# Patient Record
Sex: FEMALE | Race: OTHER | NOT HISPANIC OR LATINO
[De-identification: names, ages, dates, MRNs, and addresses within clinical notes are randomized per-mention and may not be internally consistent; named-entity substitution may affect disease eponyms.]

---

## 2017-05-23 ENCOUNTER — APPOINTMENT (OUTPATIENT)
Dept: ORTHOPEDIC SURGERY | Facility: CLINIC | Age: 52
End: 2017-05-23

## 2017-07-18 ENCOUNTER — APPOINTMENT (OUTPATIENT)
Dept: ORTHOPEDIC SURGERY | Facility: CLINIC | Age: 52
End: 2017-07-18

## 2018-02-21 ENCOUNTER — APPOINTMENT (OUTPATIENT)
Dept: ORTHOPEDIC SURGERY | Facility: CLINIC | Age: 53
End: 2018-02-21

## 2018-05-16 ENCOUNTER — APPOINTMENT (OUTPATIENT)
Dept: ORTHOPEDIC SURGERY | Facility: CLINIC | Age: 53
End: 2018-05-16

## 2018-10-02 ENCOUNTER — APPOINTMENT (OUTPATIENT)
Dept: RHEUMATOLOGY | Facility: CLINIC | Age: 53
End: 2018-10-02

## 2018-11-21 ENCOUNTER — APPOINTMENT (OUTPATIENT)
Dept: ORTHOPEDIC SURGERY | Facility: CLINIC | Age: 53
End: 2018-11-21

## 2018-12-11 ENCOUNTER — APPOINTMENT (OUTPATIENT)
Dept: ORTHOPEDIC SURGERY | Facility: CLINIC | Age: 53
End: 2018-12-11

## 2019-01-15 ENCOUNTER — APPOINTMENT (OUTPATIENT)
Dept: ORTHOPEDIC SURGERY | Facility: CLINIC | Age: 54
End: 2019-01-15

## 2019-02-04 ENCOUNTER — FORM ENCOUNTER (OUTPATIENT)
Age: 54
End: 2019-02-04

## 2019-02-05 ENCOUNTER — APPOINTMENT (OUTPATIENT)
Dept: ORTHOPEDIC SURGERY | Facility: CLINIC | Age: 54
End: 2019-02-05
Payer: MEDICARE

## 2019-02-05 ENCOUNTER — OUTPATIENT (OUTPATIENT)
Dept: OUTPATIENT SERVICES | Facility: HOSPITAL | Age: 54
LOS: 1 days | End: 2019-02-05
Payer: MEDICARE

## 2019-02-05 PROCEDURE — 73562 X-RAY EXAM OF KNEE 3: CPT | Mod: 26,50

## 2019-02-05 PROCEDURE — 20610 DRAIN/INJ JOINT/BURSA W/O US: CPT | Mod: 50

## 2019-02-05 PROCEDURE — 99203 OFFICE O/P NEW LOW 30 MIN: CPT | Mod: 25

## 2019-02-05 PROCEDURE — 73562 X-RAY EXAM OF KNEE 3: CPT

## 2019-02-05 NOTE — PROCEDURE
[Injection] : Injection [Bilateral] : of bilateral [Knee Joint] : knee joint [Osteoarthritis] : Osteoarthritis [Diagnostic] : Diagnostic [Patient] : patient [Alcohol] : Alcohol [Betadine] : Betadine [None] : No local anesthesia was used [Anterior] : anterior [20] : a 20-gauge [1% Lidocaine___(mL)] : [unfilled] mL of 1% Lidocaine [Triamcinolone 40mg/mL___(mL)] : [unfilled] ~UmL of 40mg/mL triamcinolone [Bandage Applied] : a bandage [Tolerated Well] : The patient tolerated the procedure well

## 2019-02-06 ENCOUNTER — TRANSCRIPTION ENCOUNTER (OUTPATIENT)
Age: 54
End: 2019-02-06

## 2019-02-06 VITALS — HEIGHT: 64 IN | BODY MASS INDEX: 25.61 KG/M2 | WEIGHT: 150 LBS

## 2019-02-08 NOTE — PHYSICAL EXAM
[de-identified] : Gen: NAD/AAOx3, cooperative\par HEENT: NC/AT\par CV: RRR\par Resp: nonlabored breathing, equal chest rise\par Abd: soft/nontender/nondistended\par B knees:\par - Varus alignment, passively correctable\par - L ROM 0-140, R ROM 5-130\par - Lig stable V/V/A/P\par - TTP medial joint lines, minimal pain at lateral\par - Pain and crepitus on patellar grind\par - Motor 5/5\par - NVID [de-identified] : Bilateral knees with tricompartmental OA, worst in medial compartments

## 2019-02-08 NOTE — HISTORY OF PRESENT ILLNESS
[de-identified] : 54y/o female seeking evaluation of bilateral knee and low back pain. PMH significant for 3-level lumbar fusion some years ago which has always been painful, as well as questionable history of SLE s/p 8 years of antirheumatic treatment - she reports that her rheumatologist eventually disavowed the diagnosis and stopped treatment. She is dependent on oxycodone, oxycontin, gabapentin, and clonazepam. She reports a usage of 300mg oxycodone and 90mg oxycontin daily. This has been the routine for approximately two years. For the knees, she has attempted multiple types of injections over the last 3-4 years, including CSI and Supartz, all with no relief whatsoever. She is disabled secondary to her low back issues. She reports relatively little pain with walking, more pain and disability when she has to sit for a long period of time. There is significant night pain in the medial aspect of her knees.

## 2019-02-08 NOTE — DISCUSSION/SUMMARY
[de-identified] : 54y/o female with chronic pain, lumbar spine disease s/p 3-level fusion, and bilateral knee OA\par - Pt may be a candidate for bilateral TKA, but given her complex history of chronic and potentially neuropathic pain, must confirm that knees are truly the source of her pain\par - Injections given today as above for diagnostic and therapeutic purposes; she reports knee pain relief from 3/10 to 0/10\par - Pt to seek evaluation of ongoing lumbar spine issues from Dr. Montana\par - Pt to f/u with her PMD to discuss eventual opioid wean\par - PT\par - RTC 2wk to re-eval...

## 2019-02-11 ENCOUNTER — APPOINTMENT (OUTPATIENT)
Dept: ORTHOPEDIC SURGERY | Facility: CLINIC | Age: 54
End: 2019-02-11

## 2019-02-19 ENCOUNTER — APPOINTMENT (OUTPATIENT)
Dept: ORTHOPEDIC SURGERY | Facility: CLINIC | Age: 54
End: 2019-02-19
Payer: MEDICARE

## 2019-02-19 PROCEDURE — 99213 OFFICE O/P EST LOW 20 MIN: CPT

## 2019-03-01 NOTE — PHYSICAL EXAM
[de-identified] : Gen: NAD/AAOx3, cooperative\par HEENT: NC/AT\par CV: RRR\par Resp: nonlabored breathing, equal chest rise\par Abd: soft/nontender/nondistended\par B knees:\par - Varus alignment, passively correctable\par - L ROM 0-140, R ROM 5-130\par - Lig stable V/V/A/P\par - TTP medial joint lines, minimal pain at lateral\par - Pain and crepitus on patellar grind\par - Motor 5/5\par - NVID

## 2019-03-01 NOTE — DISCUSSION/SUMMARY
[de-identified] : 54y/o female 2wk s/p bilateral knee CSI for osteoarthritis\par - Begin PT\par - Seek opinions from Esther Montana and Eber\par - RTC 10wk for possible repeat CSI...

## 2019-03-01 NOTE — HISTORY OF PRESENT ILLNESS
[de-identified] : 54y/o female following up 2wk s/p bilateral knee CSI for osteoarthritis. States she had 100% pain relief since the day after injection. Minimal pain in the left knee today only. She did not follow up yet with Dr. Montana for the spine or Dr. Velázquez for her chronic opioid dependence. She has not yet begun PT.

## 2019-03-05 ENCOUNTER — APPOINTMENT (OUTPATIENT)
Dept: ORTHOPEDIC SURGERY | Facility: CLINIC | Age: 54
End: 2019-03-05

## 2019-04-09 ENCOUNTER — TRANSCRIPTION ENCOUNTER (OUTPATIENT)
Age: 54
End: 2019-04-09

## 2019-04-09 ENCOUNTER — APPOINTMENT (OUTPATIENT)
Dept: ORTHOPEDIC SURGERY | Facility: CLINIC | Age: 54
End: 2019-04-09

## 2019-05-04 ENCOUNTER — EMERGENCY (EMERGENCY)
Facility: HOSPITAL | Age: 54
LOS: 0 days | Discharge: HOME | End: 2019-05-04
Attending: EMERGENCY MEDICINE | Admitting: EMERGENCY MEDICINE
Payer: MEDICARE

## 2019-05-04 VITALS
SYSTOLIC BLOOD PRESSURE: 168 MMHG | OXYGEN SATURATION: 98 % | DIASTOLIC BLOOD PRESSURE: 114 MMHG | HEART RATE: 134 BPM | RESPIRATION RATE: 21 BRPM | TEMPERATURE: 97 F

## 2019-05-04 DIAGNOSIS — Z88.6 ALLERGY STATUS TO ANALGESIC AGENT: ICD-10-CM

## 2019-05-04 DIAGNOSIS — Z90.49 ACQUIRED ABSENCE OF OTHER SPECIFIED PARTS OF DIGESTIVE TRACT: ICD-10-CM

## 2019-05-04 DIAGNOSIS — K52.9 NONINFECTIVE GASTROENTERITIS AND COLITIS, UNSPECIFIED: ICD-10-CM

## 2019-05-04 DIAGNOSIS — R10.9 UNSPECIFIED ABDOMINAL PAIN: ICD-10-CM

## 2019-05-04 LAB
ALBUMIN SERPL ELPH-MCNC: 5.1 G/DL — SIGNIFICANT CHANGE UP (ref 3.5–5.2)
ALP SERPL-CCNC: 91 U/L — SIGNIFICANT CHANGE UP (ref 30–115)
ALT FLD-CCNC: 9 U/L — SIGNIFICANT CHANGE UP (ref 0–41)
ANION GAP SERPL CALC-SCNC: 18 MMOL/L — HIGH (ref 7–14)
APPEARANCE UR: ABNORMAL
AST SERPL-CCNC: 12 U/L — SIGNIFICANT CHANGE UP (ref 0–41)
BACTERIA # UR AUTO: ABNORMAL /HPF
BASOPHILS # BLD AUTO: 0.05 K/UL — SIGNIFICANT CHANGE UP (ref 0–0.2)
BASOPHILS NFR BLD AUTO: 0.5 % — SIGNIFICANT CHANGE UP (ref 0–1)
BILIRUB DIRECT SERPL-MCNC: <0.2 MG/DL — SIGNIFICANT CHANGE UP (ref 0–0.2)
BILIRUB INDIRECT FLD-MCNC: >0.3 MG/DL — SIGNIFICANT CHANGE UP (ref 0.2–1.2)
BILIRUB SERPL-MCNC: 0.5 MG/DL — SIGNIFICANT CHANGE UP (ref 0.2–1.2)
BILIRUB UR-MCNC: NEGATIVE — SIGNIFICANT CHANGE UP
BUN SERPL-MCNC: 9 MG/DL — LOW (ref 10–20)
CALCIUM SERPL-MCNC: 10.1 MG/DL — SIGNIFICANT CHANGE UP (ref 8.5–10.1)
CHLORIDE SERPL-SCNC: 100 MMOL/L — SIGNIFICANT CHANGE UP (ref 98–110)
CO2 SERPL-SCNC: 24 MMOL/L — SIGNIFICANT CHANGE UP (ref 17–32)
COLOR SPEC: YELLOW — SIGNIFICANT CHANGE UP
CREAT SERPL-MCNC: 0.8 MG/DL — SIGNIFICANT CHANGE UP (ref 0.7–1.5)
DIFF PNL FLD: NEGATIVE — SIGNIFICANT CHANGE UP
EOSINOPHIL # BLD AUTO: 0.03 K/UL — SIGNIFICANT CHANGE UP (ref 0–0.7)
EOSINOPHIL NFR BLD AUTO: 0.3 % — SIGNIFICANT CHANGE UP (ref 0–8)
EPI CELLS # UR: ABNORMAL /HPF
GLUCOSE SERPL-MCNC: 120 MG/DL — HIGH (ref 70–99)
GLUCOSE UR QL: NEGATIVE MG/DL — SIGNIFICANT CHANGE UP
HCG SERPL QL: NEGATIVE — SIGNIFICANT CHANGE UP
HCT VFR BLD CALC: 40.5 % — SIGNIFICANT CHANGE UP (ref 37–47)
HGB BLD-MCNC: 14.3 G/DL — SIGNIFICANT CHANGE UP (ref 12–16)
IMM GRANULOCYTES NFR BLD AUTO: 0.6 % — HIGH (ref 0.1–0.3)
KETONES UR-MCNC: NEGATIVE — SIGNIFICANT CHANGE UP
LACTATE SERPL-SCNC: 1.5 MMOL/L — SIGNIFICANT CHANGE UP (ref 0.5–2.2)
LEUKOCYTE ESTERASE UR-ACNC: ABNORMAL
LIDOCAIN IGE QN: 15 U/L — SIGNIFICANT CHANGE UP (ref 7–60)
LYMPHOCYTES # BLD AUTO: 1.74 K/UL — SIGNIFICANT CHANGE UP (ref 1.2–3.4)
LYMPHOCYTES # BLD AUTO: 16.9 % — LOW (ref 20.5–51.1)
MCHC RBC-ENTMCNC: 29.5 PG — SIGNIFICANT CHANGE UP (ref 27–31)
MCHC RBC-ENTMCNC: 35.3 G/DL — SIGNIFICANT CHANGE UP (ref 32–37)
MCV RBC AUTO: 83.7 FL — SIGNIFICANT CHANGE UP (ref 81–99)
MONOCYTES # BLD AUTO: 0.38 K/UL — SIGNIFICANT CHANGE UP (ref 0.1–0.6)
MONOCYTES NFR BLD AUTO: 3.7 % — SIGNIFICANT CHANGE UP (ref 1.7–9.3)
NEUTROPHILS # BLD AUTO: 8.06 K/UL — HIGH (ref 1.4–6.5)
NEUTROPHILS NFR BLD AUTO: 78 % — HIGH (ref 42.2–75.2)
NITRITE UR-MCNC: NEGATIVE — SIGNIFICANT CHANGE UP
NRBC # BLD: 0 /100 WBCS — SIGNIFICANT CHANGE UP (ref 0–0)
PH UR: 7 — SIGNIFICANT CHANGE UP (ref 5–8)
PLATELET # BLD AUTO: 370 K/UL — SIGNIFICANT CHANGE UP (ref 130–400)
POTASSIUM SERPL-MCNC: 3.6 MMOL/L — SIGNIFICANT CHANGE UP (ref 3.5–5)
POTASSIUM SERPL-SCNC: 3.6 MMOL/L — SIGNIFICANT CHANGE UP (ref 3.5–5)
PROT SERPL-MCNC: 7.7 G/DL — SIGNIFICANT CHANGE UP (ref 6–8)
PROT UR-MCNC: NEGATIVE MG/DL — SIGNIFICANT CHANGE UP
RBC # BLD: 4.84 M/UL — SIGNIFICANT CHANGE UP (ref 4.2–5.4)
RBC # FLD: 11.6 % — SIGNIFICANT CHANGE UP (ref 11.5–14.5)
SODIUM SERPL-SCNC: 142 MMOL/L — SIGNIFICANT CHANGE UP (ref 135–146)
SP GR SPEC: <=1.005 — SIGNIFICANT CHANGE UP (ref 1.01–1.03)
UROBILINOGEN FLD QL: 0.2 MG/DL — SIGNIFICANT CHANGE UP (ref 0.2–0.2)
WBC # BLD: 10.32 K/UL — SIGNIFICANT CHANGE UP (ref 4.8–10.8)
WBC # FLD AUTO: 10.32 K/UL — SIGNIFICANT CHANGE UP (ref 4.8–10.8)
WBC UR QL: ABNORMAL /HPF

## 2019-05-04 PROCEDURE — 99284 EMERGENCY DEPT VISIT MOD MDM: CPT | Mod: GC

## 2019-05-04 PROCEDURE — 93010 ELECTROCARDIOGRAM REPORT: CPT

## 2019-05-04 PROCEDURE — 74177 CT ABD & PELVIS W/CONTRAST: CPT | Mod: 26

## 2019-05-04 RX ORDER — HYDROMORPHONE HYDROCHLORIDE 2 MG/ML
1 INJECTION INTRAMUSCULAR; INTRAVENOUS; SUBCUTANEOUS ONCE
Qty: 0 | Refills: 0 | Status: DISCONTINUED | OUTPATIENT
Start: 2019-05-04 | End: 2019-05-04

## 2019-05-04 RX ORDER — CIPROFLOXACIN LACTATE 400MG/40ML
1 VIAL (ML) INTRAVENOUS
Qty: 14 | Refills: 0
Start: 2019-05-04 | End: 2019-05-10

## 2019-05-04 RX ORDER — SODIUM CHLORIDE 9 MG/ML
1000 INJECTION INTRAMUSCULAR; INTRAVENOUS; SUBCUTANEOUS ONCE
Qty: 0 | Refills: 0 | Status: COMPLETED | OUTPATIENT
Start: 2019-05-04 | End: 2019-05-04

## 2019-05-04 RX ORDER — METRONIDAZOLE 500 MG
1 TABLET ORAL
Qty: 21 | Refills: 0
Start: 2019-05-04 | End: 2019-05-10

## 2019-05-04 RX ORDER — MORPHINE SULFATE 50 MG/1
4 CAPSULE, EXTENDED RELEASE ORAL ONCE
Qty: 0 | Refills: 0 | Status: DISCONTINUED | OUTPATIENT
Start: 2019-05-04 | End: 2019-05-04

## 2019-05-04 RX ORDER — METRONIDAZOLE 500 MG
500 TABLET ORAL ONCE
Qty: 0 | Refills: 0 | Status: COMPLETED | OUTPATIENT
Start: 2019-05-04 | End: 2019-05-04

## 2019-05-04 RX ORDER — CIPROFLOXACIN LACTATE 400MG/40ML
500 VIAL (ML) INTRAVENOUS ONCE
Qty: 0 | Refills: 0 | Status: COMPLETED | OUTPATIENT
Start: 2019-05-04 | End: 2019-05-04

## 2019-05-04 RX ADMIN — MORPHINE SULFATE 4 MILLIGRAM(S): 50 CAPSULE, EXTENDED RELEASE ORAL at 19:45

## 2019-05-04 RX ADMIN — MORPHINE SULFATE 4 MILLIGRAM(S): 50 CAPSULE, EXTENDED RELEASE ORAL at 18:19

## 2019-05-04 RX ADMIN — SODIUM CHLORIDE 1000 MILLILITER(S): 9 INJECTION INTRAMUSCULAR; INTRAVENOUS; SUBCUTANEOUS at 20:20

## 2019-05-04 RX ADMIN — SODIUM CHLORIDE 2000 MILLILITER(S): 9 INJECTION INTRAMUSCULAR; INTRAVENOUS; SUBCUTANEOUS at 19:00

## 2019-05-04 RX ADMIN — MORPHINE SULFATE 4 MILLIGRAM(S): 50 CAPSULE, EXTENDED RELEASE ORAL at 19:52

## 2019-05-04 RX ADMIN — Medication 500 MILLIGRAM(S): at 23:33

## 2019-05-04 RX ADMIN — SODIUM CHLORIDE 2000 MILLILITER(S): 9 INJECTION INTRAMUSCULAR; INTRAVENOUS; SUBCUTANEOUS at 20:37

## 2019-05-04 RX ADMIN — HYDROMORPHONE HYDROCHLORIDE 1 MILLIGRAM(S): 2 INJECTION INTRAMUSCULAR; INTRAVENOUS; SUBCUTANEOUS at 22:51

## 2019-05-04 NOTE — ED PROVIDER NOTE - NS ED ROS FT
Constitutional: no fever, chills, no recent weight loss, change in appetite or malaise  Cardiac: No chest pain, SOB or edema.  Respiratory: No cough or respiratory distress  GI: + LLQ abdominal pain. No n/v/d/rectal bleeding  : No dysuria, frequency, urgency or hematuria  MS: no pain to back or extremities, no loss of ROM, no weakness  Neuro: No headache or weakness. No LOC.  Skin: No skin rash.  Endocrine: No history of thyroid disease or diabetes.  Except as documented in the HPI, all other systems are negative.

## 2019-05-04 NOTE — ED PROVIDER NOTE - CARE PROVIDER_API CALL
Dakota Parikh)  Gastroenterology; Internal Medicine  [address] [place] [address]  Phone: [phone]  Fax: [phone]  Follow Up Time:

## 2019-05-04 NOTE — ED PROVIDER NOTE - OBJECTIVE STATEMENT
53 year old F with hx of spinal fusion, cholecystectomy c/o LLQ abdominal pain x 3 days. The pain is sharp, constant and radiates to her left lower back. Sts the pain worsened today so decided to come to ED. She denies any assoc symptoms; no fever/chills, n/v, urinary symptoms, constipation/diarrhea, recent surgeries, chest pain, sob.

## 2019-05-04 NOTE — ED PROVIDER NOTE - PHYSICAL EXAMINATION
CONSTITUTIONAL: Well-appearing; well-nourished; in no apparent distress.   NECK: Supple; non-tender; no cervical lymphadenopathy.   CARDIOVASCULAR: Normal S1, S2; no murmurs, rubs, or gallops.   RESPIRATORY: Normal chest excursion with respiration; breath sounds clear and equal bilaterally; no wheezes, rhonchi, or rales.  GI/: Normal bowel sounds; non-distended; + mild LLQ ttp, no rebound or guarding. Neg murphys sign. No ttp at mcburneys point. No CVA ttp.  MS: No evidence of trauma or deformity. Normal ROM in all four extremities; non-tender to palpation; distal pulses are normal.   SKIN: Normal for age and race; warm; dry; good turgor; no apparent lesions or exudate.   NEURO/PSYCH: A & O x 4; grossly unremarkable. mood and manner are appropriate. Grooming and personal hygiene are appropriate.

## 2019-05-04 NOTE — ED PROVIDER NOTE - ATTENDING CONTRIBUTION TO CARE
54 yo f with presents with LLQ pain for 3 days.  no cp, no sob, no headache, no dizziness, no fevers, no chills.  no dysuria.  no other complaints.  awake, alert.  neck supple.  Abd soft with LLQ tenderness, no guarding.  Lungs clear.  mmm.  p:  ct, labs, pain control, ivf, reassess.

## 2019-05-04 NOTE — ED ADULT NURSE NOTE - NSIMPLEMENTINTERV_GEN_ALL_ED
Implemented All Universal Safety Interventions:  [place] to call system. Call bell, personal items and telephone within reach. Instruct patient to call for assistance. Room bathroom lighting operational. Non-slip footwear when patient is off stretcher. Physically safe environment: no spills, clutter or unnecessary equipment. Stretcher in lowest position, wheels locked, appropriate side rails in place.

## 2019-05-04 NOTE — ED PROVIDER NOTE - CLINICAL SUMMARY MEDICAL DECISION MAKING FREE TEXT BOX
pt with abd pain.  CT with colitis.  pt nontoxic, well appearing.  pt ambulating in the ED without difficutly.  vitals improved.  pt given abx.  pt given rx for abx.  will dc with outpatient GI follow up for further evaluation.    Pt understands importance of GI follow up.  pt also with elevated BP.  I spoke to pt at length about this and about the need to follow up with pmd for this and to have BP repeated and if still elevated will need to be treated.  pt understands and will follow up.  pt thinks it is related to her pain and being in the ED.  Pt only complained of lower abd pain.  NO CHEST PAIN, no SOB.  pt with colitis. pt with abd pain.  CT with colitis.  pt nontoxic, well appearing.  pt ambulating in the ED without difficutly.  vitals improved.  pt given abx.  pt given rx for abx.  will dc with outpatient GI follow up for further evaluation.    Pt understands importance of GI follow up.  pt also with elevated BP.  I spoke to pt at length about this and about the need to follow up with pmd for this and to have BP repeated and if still elevated will need to be treated.  pt understands and will follow up.  pt thinks it is related to her pain and being in the ED.  Pt has history of chronic pain and is on medications for pain as outpatient.    Pt only complained of lower abd pain.  NO CHEST PAIN, no SOB.  pt with colitis.

## 2019-05-05 VITALS
RESPIRATION RATE: 18 BRPM | DIASTOLIC BLOOD PRESSURE: 105 MMHG | SYSTOLIC BLOOD PRESSURE: 180 MMHG | OXYGEN SATURATION: 100 % | HEART RATE: 107 BPM

## 2019-05-05 NOTE — ED POST DISCHARGE NOTE - ADDITIONAL DOCUMENTATION
I called pt to follow up.  Pt reports she is doing a little better today.  pain is improving.  no worsening symptoms.  no nausea, no vomiting, no cp, no sob, no fevers.  pt is going to take the abx.  pt is going to follow up with GI.  pt given strict follow up and return precautions.  pt understands to return to ed for any worsening symptoms, fevers, vomiting, etc, or any concerning symptoms.  I also stressed to pt the importance of follow up for her BP and to have it repeated and re evaluated.  pt understands and will follow up with her doctor tomorrow for repeat.  pt aware if it remains elevated she will need further evaluation and management.  pt reports she only has left lower abd pain.  no fevers, no chills, no cp, no sob. I called pt to follow up.  Pt reports she is doing a little better today.  pain is improving.  no worsening symptoms.  no nausea, no vomiting, no cp, no sob, no fevers.  pt is going to take the abx.  pt is going to follow up with GI.  pt given strict follow up and return precautions.  pt understands to return to ed for any worsening symptoms, fevers, vomiting, etc, or any concerning symptoms.  I also stressed to pt the importance of follow up for her BP and to have it repeated and re evaluated.  pt understands and will follow up with her doctor tomorrow for repeat.  pt aware if it remains elevated she will need further evaluation and management.  pt reports she only has left lower abd pain.  no fevers, no chills, no cp, no sob.  I also informed pt of official ct finding about appendix.  Pt never had any RLQ pain.  pt has NO RIGHT SIDED pain at all.  I spoke to  her at length about this ct finding and she understands if she has any right sided pain to return to the ED.  pt aware of need for GI follow up and need for colonoscopy.  pt will follow up.  On exam PT HAD NO RIGHT SIDED TENDERNESS.

## 2019-05-07 ENCOUNTER — APPOINTMENT (OUTPATIENT)
Dept: ORTHOPEDIC SURGERY | Facility: CLINIC | Age: 54
End: 2019-05-07

## 2019-05-28 ENCOUNTER — APPOINTMENT (OUTPATIENT)
Dept: ORTHOPEDIC SURGERY | Facility: CLINIC | Age: 54
End: 2019-05-28

## 2019-05-29 ENCOUNTER — APPOINTMENT (OUTPATIENT)
Dept: NEUROSURGERY | Facility: CLINIC | Age: 54
End: 2019-05-29

## 2019-05-30 NOTE — ED PROVIDER NOTE - PROVIDER TOKENS
POST CATARACT SURGERY EYE INSTRUCTIONS  DR. NOEL           Eye Medications    VIGAMOX/OFLOXACIN (Tan Cap)    KETOROLAC (Browne Cap)    PREDNISOLONE (Pink or White Cap)    If you opted for the individual bottles of eye medications follow these instructions.    *Instill one drop from each bottle into the operative eye 3 times a day until each  bottle is empty.    *Wait 5 minutes between each drop.    If you opted for the one bottle containing all 3 eye medications, follow these instructions.    *Instill one drop into the operative eye 3 times a day until the bottle is empty.       - If your are currently being treated for glaucoma please continue your    medication as normally prescribed.     - Wear eye shield while sleeping for 3 days     - Refrain from heavy lifting, bending, straining, or strenuous activity for 1      week.     - Do not rub or push on the operative eye for 1 week (you may wipe the   eye lid(s) gently with a wet wash cloth to remove matter from eyelashes).     - You may bathe or shower, but do not get the eye wet for 1 month      (swimming, hot tubs or other water activities).     - Minor itching, scratching sensation, burning sensation, etc. is normal.     Report any severe eye pain or loss of vision.     - Please call our office at [phone] if you have questions or     Concerns.  [facility]Day [facility]   Adult Discharge Orders & Instructions     For 24 hours after surgery    1. Get plenty of rest.  A responsible adult must stay with you for at least 24 hours after you leave the hospital.   2. Do not drive or use heavy equipment.  If you have weakness or tingling, don't drive or use heavy equipment until this feeling goes away.  3. Do not drink alcohol.  4. Avoid strenuous or risky activities.  Ask for help when climbing stairs.   5. You may feel lightheaded.  If so, sit for a few minutes before standing.  Have someone help you get up.   6. You may have a slight fever. Call  the doctor if your fever is over 100 F (37.7 C) (taken under the tongue) or lasts longer than 24 hours.  7. You may have a dry mouth, a sore throat, muscle aches or trouble sleeping.  These should go away after 24 hours.  8. Do not make important or legal decisions.  We don t expect you to have any problems from the surgery or treatment you had today. Just in case, here s what to do if you have pain, upset stomach (nausea), bleeding or infection:  Pain:  Take medicines your doctor has prescribed or over-the-counter medicine they have suggested. Resting and using ice packs can help, too. For surgery on an arm or leg, raise it on a pillow to ease swelling. Call your doctor if these methods don t work.  Copyright [employer], Licensed under [employer]  Upset stomach (nausea):  Take anti-nausea medicine approved by your doctor. Drink clear liquids like apple juice, ginger ale, broth or 7-Up. Be sure to drink enough fluids. Rest can help, too. Move to normal foods when you re ready. Bleeding:      Call your doctor.  Copyright [employer], Licensed under [employer]  Fever/Infection: Please call your doctor if you have any of these signs:    Redness    Swelling    Wound feels warm    Pain gets worse    Bad-smelling fluid leaks from wound    Fever or chills  Call your doctor for any of the followin.  It has been over 8 to 10 hours since surgery and you are still not able to urinate (pass water).    2.  Headache for over 24 hours.    Nurse advice line: [phone]       PROVIDER:[TOKEN:[[id number]]]

## 2019-06-03 ENCOUNTER — APPOINTMENT (OUTPATIENT)
Dept: NEUROSURGERY | Facility: CLINIC | Age: 54
End: 2019-06-03

## 2019-07-15 ENCOUNTER — APPOINTMENT (OUTPATIENT)
Dept: ORTHOPEDIC SURGERY | Facility: CLINIC | Age: 54
End: 2019-07-15

## 2019-08-13 ENCOUNTER — APPOINTMENT (OUTPATIENT)
Dept: ORTHOPEDIC SURGERY | Facility: CLINIC | Age: 54
End: 2019-08-13
Payer: MEDICARE

## 2019-08-13 PROCEDURE — 99213 OFFICE O/P EST LOW 20 MIN: CPT | Mod: 25

## 2019-08-13 PROCEDURE — 20610 DRAIN/INJ JOINT/BURSA W/O US: CPT | Mod: 50

## 2019-09-02 NOTE — ASSESSMENT
[FreeTextEntry1] : Assessment/plan\par Bilateral knee arthritis\par \par Bilateral knees injected with corticosteroid as described above, she'll followup in 3 months. Today she will rest ice and use Tylenol as needed for pain.\par \par All medical record entries made by the PA/Scribe/Fellow are at my, Dr. Swapnil Elise's direction and personally dictated by me on 08/13/2019]. I have reviewed the chart and agree that the record accurately reflects my personal performance of the history, physical exam, assessment, and plan. I have also personally directed reviewed, and agreed with the chart.\par

## 2019-09-02 NOTE — PROCEDURE
[de-identified] : After obtaining verbal consent and under the normal sterile conditions bilateral knee joints were injected with corticosteroid, 4 cc lidocaine and 1 cc of Kenalog.

## 2019-09-02 NOTE — HISTORY OF PRESENT ILLNESS
[de-identified] : Here today for bilateral knee corticosteroid injections, she had her last injections in February and she did great for 6 months. She wants the same injections again today. No other complaints.

## 2019-09-02 NOTE — PHYSICAL EXAM
[de-identified] : Gen: NAD/AAOx3, cooperative\par HEENT: NC/AT\par CV: RRR\par Resp: nonlabored breathing, equal chest rise\par Abd: soft/nontender/nondistended\par B knees:\par - Varus alignment, passively correctable\par - L ROM 0-140, R ROM 5-130\par - Lig stable V/V/A/P\par - TTP medial joint lines, minimal pain at lateral\par - Pain and crepitus on patellar grind\par - Motor 5/5\par - NVID \par

## 2019-10-22 ENCOUNTER — APPOINTMENT (OUTPATIENT)
Dept: ORTHOPEDIC SURGERY | Facility: CLINIC | Age: 54
End: 2019-10-22
Payer: MEDICARE

## 2019-10-22 PROCEDURE — 99212 OFFICE O/P EST SF 10 MIN: CPT

## 2019-10-29 ENCOUNTER — APPOINTMENT (OUTPATIENT)
Dept: ORTHOPEDIC SURGERY | Facility: CLINIC | Age: 54
End: 2019-10-29

## 2019-11-06 ENCOUNTER — APPOINTMENT (OUTPATIENT)
Dept: ORTHOPEDIC SURGERY | Facility: CLINIC | Age: 54
End: 2019-11-06

## 2019-11-08 NOTE — ASSESSMENT
[FreeTextEntry1] : 54yo female with bilateral knee OA - severe\par \par euflexa 1 of 3 given today\par mobic Rx given\par follow up in 1 week for euflexa 2 of 3. \par \par All medical record entries made by the PA/Scribe/Fellow are at my, Dr. Swapnil Elise's direction and personally dictated by me on 10/22/2019]. I have reviewed the chart and agree that the record accurately reflects my personal performance of the history, physical exam, assessment, and plan. I have also personally directed reviewed, and agreed with the chart.\par

## 2019-11-08 NOTE — HISTORY OF PRESENT ILLNESS
[6] : an average pain level of 6/10 [de-identified] : bilaterla knee OA. steroid injections 2 months ago have not helped. not a candidate for regen study due to narcotic use. \par \par Euflexxa\par 1 of 3 bilateral knee\par lot: [id number]\par exp: 2020-09-28\par \par

## 2019-11-08 NOTE — PHYSICAL EXAM
[de-identified] : Right and Left Knee: skin is intact, no erythema. mild soft tissue swelling and  joint effusion. + lateral and medial joint line tenderness, negative apleys, negative lachman, no collateral ligamentous laxity with stress. ROM is 0-120 degrees with no pain. DF/PF/EHL intact with 5/5 strength. 2+ DP pulse. SILT L3-S1.\par

## 2019-11-12 ENCOUNTER — APPOINTMENT (OUTPATIENT)
Dept: ORTHOPEDIC SURGERY | Facility: CLINIC | Age: 54
End: 2019-11-12

## 2019-12-02 ENCOUNTER — APPOINTMENT (OUTPATIENT)
Dept: ORTHOPEDIC SURGERY | Facility: CLINIC | Age: 54
End: 2019-12-02
Payer: MEDICARE

## 2019-12-02 PROCEDURE — 20610 DRAIN/INJ JOINT/BURSA W/O US: CPT | Mod: 50

## 2019-12-02 PROCEDURE — 99212 OFFICE O/P EST SF 10 MIN: CPT | Mod: 25

## 2019-12-02 NOTE — HISTORY OF PRESENT ILLNESS
[de-identified] : Misael has not been here in about a month, she missed a couple point, we did injection 1/3 of Euflexxa few weeks ago but she had a really painful knee right afterwards, she wants just cortisone injections today. No other complaints.

## 2019-12-02 NOTE — PHYSICAL EXAM
[de-identified] : Gen: NAD/AAOx3, cooperative\par HEENT: NC/AT\par CV: RRR\par Resp: nonlabored breathing, equal chest rise\par Abd: soft/nontender/nondistended\par B knees:\par - Varus alignment, passively correctable\par - L ROM 0-140, R ROM 5-130\par - Lig stable V/V/A/P\par - TTP medial joint lines, minimal pain at lateral\par - Pain and crepitus on patellar grind\par - Motor 5/5\par - NVID \par

## 2019-12-02 NOTE — PROCEDURE
[de-identified] : After obtaining verbal consent and under normal sterile conditions each knee was injected with 4 cc of lidocaine and 1 cc of 40 mg per mL of Kenalog.

## 2019-12-02 NOTE — ASSESSMENT
[FreeTextEntry1] : Assessment/plan\par Bilateral knee arthritis\par \par We used a cortisone injection today in each knee to calm down her knee inflammation after the gel injections over a month ago which were ineffective, she'll rest ice and use Tylenol as needed today for pain. She follow up next week and we can resume the gel injection course.\par \par All medical record entries made by the PA/Scribe/Fellow are at my, Dr. Swapnil Elise's direction and personally dictated by me on 12/02/2019]. I have reviewed the chart and agree that the record accurately reflects my personal performance of the history, physical exam, assessment, and plan. I have also personally directed reviewed, and agreed with the chart.\par

## 2019-12-28 NOTE — ED PROVIDER NOTE - NS ED NOTE AC HIGH RISK COUNTRIES
No Other/Therapeutic Intensity/IV hydration, supportive care, AM labs, Endocrine consult, general observation care / monitoring.

## 2020-01-30 ENCOUNTER — APPOINTMENT (OUTPATIENT)
Dept: ORTHOPEDIC SURGERY | Facility: HOSPITAL | Age: 55
End: 2020-01-30

## 2020-02-26 ENCOUNTER — APPOINTMENT (OUTPATIENT)
Dept: ORTHOPEDIC SURGERY | Facility: CLINIC | Age: 55
End: 2020-02-26

## 2020-03-16 ENCOUNTER — APPOINTMENT (OUTPATIENT)
Dept: ORTHOPEDIC SURGERY | Facility: CLINIC | Age: 55
End: 2020-03-16

## 2020-07-20 ENCOUNTER — APPOINTMENT (OUTPATIENT)
Dept: ORTHOPEDIC SURGERY | Facility: CLINIC | Age: 55
End: 2020-07-20

## 2020-08-03 ENCOUNTER — APPOINTMENT (OUTPATIENT)
Dept: ORTHOPEDIC SURGERY | Facility: CLINIC | Age: 55
End: 2020-08-03

## 2020-09-11 ENCOUNTER — APPOINTMENT (OUTPATIENT)
Dept: ORTHOPEDIC SURGERY | Facility: CLINIC | Age: 55
End: 2020-09-11

## 2020-10-09 ENCOUNTER — APPOINTMENT (OUTPATIENT)
Dept: ORTHOPEDIC SURGERY | Facility: CLINIC | Age: 55
End: 2020-10-09
Payer: MEDICARE

## 2020-10-09 DIAGNOSIS — Z87.39 PERSONAL HISTORY OF OTHER DISEASES OF THE MUSCULOSKELETAL SYSTEM AND CONNECTIVE TISSUE: ICD-10-CM

## 2020-10-09 DIAGNOSIS — M54.9 DORSALGIA, UNSPECIFIED: ICD-10-CM

## 2020-10-09 DIAGNOSIS — Z78.9 OTHER SPECIFIED HEALTH STATUS: ICD-10-CM

## 2020-10-09 DIAGNOSIS — Z80.9 FAMILY HISTORY OF MALIGNANT NEOPLASM, UNSPECIFIED: ICD-10-CM

## 2020-10-09 PROCEDURE — 99214 OFFICE O/P EST MOD 30 MIN: CPT | Mod: 25

## 2020-10-09 PROCEDURE — 73564 X-RAY EXAM KNEE 4 OR MORE: CPT | Mod: LT

## 2020-10-09 PROCEDURE — 99203 OFFICE O/P NEW LOW 30 MIN: CPT

## 2020-10-09 PROCEDURE — 20610 DRAIN/INJ JOINT/BURSA W/O US: CPT | Mod: RT

## 2020-10-09 RX ADMIN — Medication 1 MG/ML: at 00:00

## 2020-10-09 NOTE — ASSESSMENT
[FreeTextEntry1] : 54 year old female with bilateral medial compartment arthritis, the right worse than left presents for evaluation and treatment. Patient has had previous lumbar spine surgery with ongoing pain and is currently on Oxycodone, gabapentin, and Oxycontin. her pain management doctor is at [facility]. The patient was here to inquire about her tx options. She has had both cortisone and gel injections in her knees. She has been seen in the past by both Dr. Jonas and Dr. Elise at [facility]. I explained to the patient that this is elective surgery, she can continue with conservative management. Surgical options would be partial vs total knee replacement. In the event that she chooses to have surgery, I recommended [facility] so that her surgeon can work in conjunction with her pain doctor to manage her post op pain.\par \par Addendum:\par Patient opted to have a cortisone injection today\par \par Discussed at length with the patient the planned steroid and lidocaine injection. The risks, benefits, convalescence and alternatives were reviewed. The possible side effects discussed included but were not limited to: pain, swelling, heat and redness. There symptoms are generally mild but if they are extensive then contact the office. Giving pain relievers by mouth such as NSAID’s or Tylenol can generally treat the reactions to  steroid and lidocaine. Rare cases of infection have been noted. Rash, hives and itching may occur post injection. If you have muscle pain or cramps, flushing and or swelling of the face, rapid heart beat, nausea, dizziness, fever, chills, headache, difficulty breathing, swelling in the arms or legs, or have a prickly feeling of your skin, contact a health care provider immediately.\par  \par Following this discussion, the bilateral knees were prepped with betadine and under sterile conditions 4 cc of 1% lidocaine and 5 cc Triamcinalone Acetonide were injected with a  gauge needle. The needle was introduced into the joint, aspiration was performed to ensure intra-articular placement and the medication was injected. Upon withdrawal of the needle the site was cleaned with alcohol and a bandaid applied. The patient tolerated the injection well and there were no adverse effects. Post injection instructions included no strenuous activity for 24 hours, cryotherapy and if there are any adverse effects to contact the office.\par

## 2020-10-09 NOTE — HISTORY OF PRESENT ILLNESS
[de-identified] : 54 year old female presents to the office today complaining of bilateral knee pain for several years. Patient was seen by another orthopedic surgeon in the past and was receiving several injections, both gel and cortisone. Patient also has a history of a spine surgery which she reports continues to give her extreme back pain. Patient currently takes Oxycodone and Oxycontin for her pain given by her pain management doctor. Patient reports pain in her knees that is constant and achy in nature. She reports swelling, buckling, and a loss of motion due to the pain. Patient reports difficulty with her ADLs and ambulation. Patient is here today to discuss her options for pain relief.

## 2020-10-09 NOTE — PHYSICAL EXAM
[de-identified] : General appearance: well nourished and hydrated, pleasant, alert and oriented x 3, cooperative.\par Cardiovascular: no apparent abnormalities, no lower leg edema, no varicosities, pedal pulses are palpable.\par Neurologic: sensation is normal, no muscle weakness in upper or lower extremities\par Dermatologic no apparent skin lesions, moist, warm, no rash.\par Gait: nonantalgic.\par \par Left knee\par Inspection: no effusion or erythema.\par Wounds: none.\par Alignment: normal.\par Palpation: medial joint line tenderness\par ROM: 0-100 degrees, mild varus passively correctible \par Ligamentous laxity: all ligaments appear stable\par Meniscal Test: negative McMurrays\par Muscle Test: good quad strength.\par \par Right knee\par Inspection: no effusion or erythema.\par Wounds: none.\par Alignment: normal.\par Palpation: medial joint line tenderness\par ROM: 0-100 degrees, mild varus passively correctible \par Ligamentous laxity: all ligaments appear stable\par Meniscal Test: negative McMurrays.\par Muscle Test: good quad strength.\par \par Left hip\par Inspection: No swelling or ecchymosis.\par Wounds: none.\par Palpation: non-tender.\par Stability: no instability.\par Strength: 5/5 all motor groups.\par ROM: no pain with FROM.\par Leg length: equal.\par \par Right hip\par Inspection: No swelling or ecchymosis.\par Wounds: none.\par Palpation: non-tender.\par Stability: no instability.\par Strength: 5/5 all motor groups.\par ROM: no pain with FROM.\par Leg length: equal.\par  [de-identified] : Imaging done today, 3 views of both knees, demonstrates bone on bone in the medial compartment on the right and significantly narrowed but maintained joint space on the left

## 2020-12-18 ENCOUNTER — NON-APPOINTMENT (OUTPATIENT)
Age: 55
End: 2020-12-18

## 2021-01-29 ENCOUNTER — APPOINTMENT (OUTPATIENT)
Dept: ORTHOPEDIC SURGERY | Facility: CLINIC | Age: 56
End: 2021-01-29

## 2021-04-01 ENCOUNTER — APPOINTMENT (OUTPATIENT)
Dept: ORTHOPEDIC SURGERY | Facility: HOSPITAL | Age: 56
End: 2021-04-01
Payer: MEDICARE

## 2021-04-01 VITALS — TEMPERATURE: 97.8 F

## 2021-04-01 PROCEDURE — 99213 OFFICE O/P EST LOW 20 MIN: CPT | Mod: 25

## 2021-04-01 PROCEDURE — 20610 DRAIN/INJ JOINT/BURSA W/O US: CPT | Mod: 50

## 2021-04-01 RX ADMIN — TRIAMCINOLONE ACETONIDE 1 MG/ML: 40 INJECTION, SUSPENSION INTRA-ARTICULAR; INTRAMUSCULAR at 00:00

## 2021-04-01 NOTE — REASON FOR VISIT
[Procedure Visit] : a procedure visit for [Knee Pain] : knee pain [Osteoarthritis, Knee] : osteoarthritis, knee

## 2021-04-01 NOTE — ASSESSMENT
[FreeTextEntry1] : Discussed at length with the patient the planned steroid and lidocaine injection. The risks, benefits, convalescence and alternatives were reviewed. The possible side effects discussed included but were not limited to: pain, swelling, heat and redness. There symptoms are generally mild but if they are extensive then contact the office. Giving pain relievers by mouth such as NSAID’s or Tylenol can generally treat the reactions to steroid and lidocaine. Rare cases of infection have been noted. Rash, hives and itching may occur post injection. If you have muscle pain or cramps, flushing and or swelling of the face, rapid heart beat, nausea, dizziness, fever, chills, headache, difficulty breathing, swelling in the arms or legs, or have a prickly feeling of your skin, contact a health care provider immediately.\par \par Following this discussion, the right and left knee was prepped with Betadine and under sterile conditions 4 cc of 1% lidocaine and 1 ml Kenalog were injected with a 21 gauge needle. The needle was introduced into the joint, aspiration was performed to ensure intra-articular placement and the medication was injected. Upon withdrawal of the needle the site was cleaned with alcohol and a Band-Aid applied. The patient tolerated the injection well and there were no adverse effects. Post injection instructions included no strenuous activity for 24 hours, cryotherapy and if there are any adverse effects to contact the office. 
Clear

## 2021-04-01 NOTE — HISTORY OF PRESENT ILLNESS
[de-identified] : 55 year old female presents to the office today for a cortisone injection into her bilateral arthritic knees.

## 2021-04-26 ENCOUNTER — APPOINTMENT (OUTPATIENT)
Dept: ORTHOPEDIC SURGERY | Facility: CLINIC | Age: 56
End: 2021-04-26

## 2021-07-04 ENCOUNTER — TRANSCRIPTION ENCOUNTER (OUTPATIENT)
Age: 56
End: 2021-07-04

## 2021-07-26 ENCOUNTER — APPOINTMENT (OUTPATIENT)
Dept: ORTHOPEDIC SURGERY | Facility: CLINIC | Age: 56
End: 2021-07-26
Payer: MEDICARE

## 2021-07-26 PROCEDURE — 20610 DRAIN/INJ JOINT/BURSA W/O US: CPT | Mod: 50

## 2021-07-26 PROCEDURE — 99213 OFFICE O/P EST LOW 20 MIN: CPT | Mod: 25

## 2021-07-26 RX ADMIN — TRIAMCINOLONE ACETONIDE 1 MG/ML: 40 INJECTION, SUSPENSION INTRA-ARTICULAR; INTRAMUSCULAR at 00:00

## 2021-07-26 NOTE — ASSESSMENT
[FreeTextEntry1] : Discussed at length with the patient the planned steroid and lidocaine injection. The risks, benefits, convalescence and alternatives were reviewed. The possible side effects discussed included but were not limited to: pain, swelling, heat and redness. There symptoms are generally mild but if they are extensive then contact the office. Giving pain relievers by mouth such as NSAID’s or Tylenol can generally treat the reactions to  steroid and lidocaine. Rare cases of infection have been noted. Rash, hives and itching may occur post injection. If you have muscle pain or cramps, flushing and or swelling of the face, rapid heart beat, nausea, dizziness, fever, chills, headache, difficulty breathing, swelling in the arms or legs, or have a prickly feeling of your skin, contact a health care provider immediately.\par  \par Following this discussion, the bilateral knees were prepped with betadine and under sterile conditions 4 cc of 1% lidocaine and 5 cc Triamcinalone Acetonide were injected with a  gauge needle. The needle was introduced into the joint, aspiration was performed to ensure intra-articular placement and the medication was injected. Upon withdrawal of the needle the site was cleaned with alcohol and a bandaid applied. The patient tolerated the injection well and there were no adverse effects. Post injection instructions included no strenuous activity for 24 hours, cryotherapy and if there are any adverse effects to contact the office.\par

## 2021-07-26 NOTE — HISTORY OF PRESENT ILLNESS
[de-identified] : 55 year old female presents to the office today for a cortisone injection into her bilateral arthritic knees.

## 2021-08-06 ENCOUNTER — APPOINTMENT (OUTPATIENT)
Dept: ORTHOPEDIC SURGERY | Facility: CLINIC | Age: 56
End: 2021-08-06

## 2021-09-10 ENCOUNTER — APPOINTMENT (OUTPATIENT)
Dept: ORTHOPEDIC SURGERY | Facility: CLINIC | Age: 56
End: 2021-09-10

## 2021-09-17 ENCOUNTER — APPOINTMENT (OUTPATIENT)
Dept: ORTHOPEDIC SURGERY | Facility: CLINIC | Age: 56
End: 2021-09-17
Payer: MEDICARE

## 2021-09-17 VITALS — TEMPERATURE: 97.7 F

## 2021-09-17 PROCEDURE — 20610 DRAIN/INJ JOINT/BURSA W/O US: CPT | Mod: LT

## 2021-09-17 RX ORDER — HYALURONATE SODIUM 20 MG/2 ML
20 SYRINGE (ML) INTRAARTICULAR
Refills: 0 | Status: COMPLETED | OUTPATIENT
Start: 2021-09-17

## 2021-09-17 RX ADMIN — Medication 2 MG/2ML: at 00:00

## 2021-09-17 NOTE — HISTORY OF PRESENT ILLNESS
[de-identified] : 55 year old female presents to the office for Euflexxa injections to her painful and arthritic bilateral knees.

## 2021-10-01 ENCOUNTER — APPOINTMENT (OUTPATIENT)
Dept: ORTHOPEDIC SURGERY | Facility: CLINIC | Age: 56
End: 2021-10-01

## 2021-10-08 ENCOUNTER — APPOINTMENT (OUTPATIENT)
Dept: ORTHOPEDIC SURGERY | Facility: CLINIC | Age: 56
End: 2021-10-08

## 2021-11-15 ENCOUNTER — APPOINTMENT (OUTPATIENT)
Dept: ORTHOPEDIC SURGERY | Facility: CLINIC | Age: 56
End: 2021-11-15

## 2021-12-06 ENCOUNTER — APPOINTMENT (OUTPATIENT)
Dept: ORTHOPEDIC SURGERY | Facility: CLINIC | Age: 56
End: 2021-12-06
Payer: MEDICARE

## 2021-12-06 PROCEDURE — 20610 DRAIN/INJ JOINT/BURSA W/O US: CPT | Mod: 50

## 2021-12-06 RX ADMIN — TRIAMCINOLONE ACETONIDE 1 MG/ML: 40 INJECTION, SUSPENSION INTRA-ARTICULAR; INTRAMUSCULAR at 00:00

## 2021-12-06 NOTE — HISTORY OF PRESENT ILLNESS
[de-identified] : 55 year old female presents to the office today for a cortisone injection into his bilateral arthritic knees.

## 2021-12-06 NOTE — ASSESSMENT
[FreeTextEntry1] : Discussed at length with the patient the planned steroid and lidocaine injection. The risks, benefits, convalescence and alternatives were reviewed. The possible side effects discussed included but were not limited to: pain, swelling, heat and redness. There symptoms are generally mild but if they are extensive then contact the office. Giving pain relievers by mouth such as NSAID’s or Tylenol can generally treat the reactions to steroid and lidocaine. Rare cases of infection have been noted. Rash, hives and itching may occur post injection. If you have muscle pain or cramps, flushing and or swelling of the face, rapid heart beat, nausea, dizziness, fever, chills, headache, difficulty breathing, swelling in the arms or legs, or have a prickly feeling of your skin, contact a health care provider immediately.\par \par Following this discussion, the right and left knee was prepped with Betadine and under sterile conditions 4 cc of 1% lidocaine and 1 ml Kenalog were injected with a 21 gauge needle. The needle was introduced into the joint, aspiration was performed to ensure intra-articular placement and the medication was injected. Upon withdrawal of the needle the site was cleaned with alcohol and a Band-Aid applied. The patient tolerated the injection well and there were no adverse effects. Post injection instructions included no strenuous activity for 24 hours, cryotherapy and if there are any adverse effects to contact the office.

## 2021-12-06 NOTE — ASSESSMENT
[FreeTextEntry1] : Discussed at length with the patient the planned steroid and lidocaine injection. The risks, benefits, convalescence and alternatives were reviewed. The possible side effects discussed included but were not limited to: pain, swelling, heat and redness. There symptoms are generally mild but if they are extensive then contact the office. Giving pain relievers by mouth such as NSAID’s or Tylenol can generally treat the reactions to  steroid and lidocaine. Rare cases of infection have been noted. Rash, hives and itching may occur post injection. If you have muscle pain or cramps, flushing and or swelling of the face, rapid heart beat, nausea, dizziness, fever, chills, headache, difficulty breathing, swelling in the arms or legs, or have a prickly feeling of your skin, contact a health care provider immediately.\par  \par Following this discussion, thebilateral knees were prepped with betadine and under sterile conditions 4 cc of 1% lidocaine and 5 cc Triamcinalone Acetonide were injected with a  gauge needle. The needle was introduced into the joint, aspiration was performed to ensure intra-articular placement and the medication was injected. Upon withdrawal of the needle the site was cleaned with alcohol and a bandaid applied. The patient tolerated the injection well and there were no adverse effects. Post injection instructions included no strenuous activity for 24 hours, cryotherapy and if there are any adverse effects to contact the office.\par

## 2022-04-06 ENCOUNTER — APPOINTMENT (OUTPATIENT)
Dept: ENDOCRINOLOGY | Facility: CLINIC | Age: 57
End: 2022-04-06

## 2022-04-11 ENCOUNTER — APPOINTMENT (OUTPATIENT)
Dept: ORTHOPEDIC SURGERY | Facility: CLINIC | Age: 57
End: 2022-04-11

## 2022-04-27 ENCOUNTER — APPOINTMENT (OUTPATIENT)
Dept: ORTHOPEDIC SURGERY | Facility: CLINIC | Age: 57
End: 2022-04-27
Payer: MEDICARE

## 2022-04-27 PROCEDURE — 20610 DRAIN/INJ JOINT/BURSA W/O US: CPT | Mod: 50

## 2022-04-27 RX ADMIN — TRIAMCINOLONE ACETONIDE 1 MG/ML: 40 INJECTION, SUSPENSION INTRA-ARTICULAR; INTRAMUSCULAR at 00:00

## 2022-04-27 NOTE — HISTORY OF PRESENT ILLNESS
[de-identified] : 55 year old female presents to the office today for a cortisone injection into his bilateral arthritic knees.

## 2022-05-05 ENCOUNTER — NON-APPOINTMENT (OUTPATIENT)
Age: 57
End: 2022-05-05

## 2022-05-05 ENCOUNTER — APPOINTMENT (OUTPATIENT)
Dept: RHEUMATOLOGY | Facility: CLINIC | Age: 57
End: 2022-05-05
Payer: MEDICARE

## 2022-05-05 VITALS
HEIGHT: 62 IN | BODY MASS INDEX: 43.24 KG/M2 | SYSTOLIC BLOOD PRESSURE: 110 MMHG | OXYGEN SATURATION: 97 % | WEIGHT: 235 LBS | DIASTOLIC BLOOD PRESSURE: 80 MMHG | HEART RATE: 85 BPM | TEMPERATURE: 97.9 F

## 2022-05-05 DIAGNOSIS — E78.5 HYPERLIPIDEMIA, UNSPECIFIED: ICD-10-CM

## 2022-05-05 DIAGNOSIS — R76.8 OTHER SPECIFIED ABNORMAL IMMUNOLOGICAL FINDINGS IN SERUM: ICD-10-CM

## 2022-05-05 PROCEDURE — 99204 OFFICE O/P NEW MOD 45 MIN: CPT

## 2022-05-05 RX ORDER — GABAPENTIN 800 MG/1
800 TABLET, COATED ORAL
Refills: 0 | Status: DISCONTINUED | COMMUNITY
End: 2022-05-05

## 2022-05-05 RX ORDER — MELOXICAM 7.5 MG/1
7.5 TABLET ORAL TWICE DAILY
Qty: 30 | Refills: 2 | Status: DISCONTINUED | COMMUNITY
Start: 2019-10-22 | End: 2022-05-05

## 2022-05-05 RX ORDER — CLONAZEPAM 2 MG/1
TABLET ORAL
Refills: 0 | Status: DISCONTINUED | COMMUNITY
End: 2022-05-05

## 2022-05-05 RX ORDER — OXYMORPHONE HYDROCHLORIDE 40 MG/1
40 TABLET, FILM COATED, EXTENDED RELEASE ORAL
Qty: 90 | Refills: 0 | Status: ACTIVE | COMMUNITY
Start: 2022-05-02

## 2022-05-05 RX ORDER — OXYCODONE HYDROCHLORIDE 30 MG/1
30 TABLET ORAL
Refills: 0 | Status: ACTIVE | COMMUNITY

## 2022-05-05 RX ORDER — MELOXICAM 15 MG/1
15 TABLET ORAL
Qty: 30 | Refills: 0 | Status: DISCONTINUED | COMMUNITY
Start: 2020-10-09 | End: 2022-05-05

## 2022-05-05 RX ORDER — OXYCODONE HYDROCHLORIDE 30 MG/1
TABLET ORAL
Refills: 0 | Status: DISCONTINUED | COMMUNITY
End: 2022-05-05

## 2022-05-05 RX ORDER — CLONAZEPAM 2 MG/1
2 TABLET ORAL
Refills: 0 | Status: ACTIVE | COMMUNITY

## 2022-05-05 NOTE — ASSESSMENT
[FreeTextEntry1] : Joint pain/weight gain/hirsutism: Pt's physical exam is suggestive of GH and knee arthritis, likely osteoarthritis based on her symptoms; also with e/o OA on hand exam, although she denies any hand pain or stiffness. Unclear what is causing her weight gain; she has not had a physical in many years so assessing her routine bloodwork and thyroid function would be a good start. \par - f/u labs for SLE, also TSH, serum cortisol and routine bloodwork\par - f/u b/l shoulder x-rays\par - Switch gabapentin to Lyrica 75 mg BID; I advised pt to taper the gabapentin by 400 mg every 3 days until she discontinues, then start Lyrica 75 mg qhs for 1 week, and she can increase to 75 mg BID after 1 week if she does not experience side effects.

## 2022-05-05 NOTE — HISTORY OF PRESENT ILLNESS
[FreeTextEntry1] : Pt has had years of pain in her upper and lower back, shoulders and knees, with difficulty performing ADLs such as cleaning. She was reportedly diagnosed with systemic lupus erythematosus many years ago, and was treated with Plaquenil, Cellcept and methotrexate, but her symptoms did not improve, and she was subsequently told she may have fibromyalgia? Her mother passed away in Fall 2021, and since that time pt gained 50 lbs, and also noticed hair growing on her face, so she is concerned she may have an underlying medical condition. Also + very dry skin. Denies diarrhea, bloody stool, Raynaud's, oral ulcers, other rashes. \par \par Pt is currently taking gabapentin for pain but feels that it is not helping her. \par \par + Long-standing difficulty sleeping, denies snoring. \par \par + 4 miscarriages.\par \par Physical exam: GEN: AAO woman sitting in chair in NAD\par SKIN: + Dry skin, no rashes\par MOUTH: Moist mucous membranes, + dentures, no oral ulcers, normal oral aperture\par ENT: no LAD\par HEAD: no alopecia\par PULM: Clear to auscultation b/l\par CV: Regular rate and rhythm, no murmurs\par MSK:\par Neck: + Pain with lateral deviation b/l but with full ROM\par Shoulders: + Limited abduction to approx 120 degrees b/l, + Apley scratch test b/l\par Elbows: Full ROM b/l, no effusions\par Wrists: Full ROM b/l, no effusions\par Hands: + Heberden's nodes b/l\par Hips: Full ROM b/l\par Knees: + Bony hypertrophy b/l, full ROM b/l, no effusions\par Ankles: no effusions, full ROM b/l\par Feet: no effusions, no TTP\par EXT: no LE edema b/l

## 2022-05-05 NOTE — REASON FOR VISIT
[Consultation] : a consultation visit [FreeTextEntry1] : Consult request from Dr. Rodas for diffuse musculoskeletal pain x years and weight gain

## 2022-05-23 LAB
ALBUMIN SERPL ELPH-MCNC: 4.7 G/DL
ALP BLD-CCNC: 109 U/L
ALT SERPL-CCNC: 18 U/L
ANA SER IF-ACNC: NEGATIVE
ANION GAP SERPL CALC-SCNC: 13 MMOL/L
AST SERPL-CCNC: 18 U/L
BASOPHILS # BLD AUTO: 0.06 K/UL
BASOPHILS NFR BLD AUTO: 0.7 %
BILIRUB SERPL-MCNC: 0.5 MG/DL
BUN SERPL-MCNC: 16 MG/DL
C3 SERPL-MCNC: 151 MG/DL
C4 SERPL-MCNC: 32 MG/DL
CALCIUM SERPL-MCNC: 9.8 MG/DL
CCP AB SER IA-ACNC: <8 UNITS
CELIACPAN: NORMAL
CENTROMERE IGG SER-ACNC: <0.2 CD:130001892
CHLORIDE SERPL-SCNC: 100 MMOL/L
CHOLEST SERPL-MCNC: 241 MG/DL
CK SERPL-CCNC: 67 U/L
CO2 SERPL-SCNC: 28 MMOL/L
CORTIS SERPL-MCNC: 1.6 UG/DL
CREAT SERPL-MCNC: 1 MG/DL
CRP SERPL-MCNC: 7 MG/L
DSDNA AB SER-ACNC: <12 IU/ML
EGFR: 66 ML/MIN/1.73M2
ENA RNP AB SER IA-ACNC: <0.2 AL
ENA SCL70 IGG SER IA-ACNC: <0.2 AL
ENA SM AB SER IA-ACNC: <0.2 AL
ENA SS-A AB SER IA-ACNC: <0.2 AL
ENA SS-B AB SER IA-ACNC: <0.2 AL
ENDOMYSIUM IGA SER QL: NEGATIVE
ENDOMYSIUM IGA TITR SER: NORMAL
EOSINOPHIL # BLD AUTO: 0.18 K/UL
EOSINOPHIL NFR BLD AUTO: 2 %
ERYTHROCYTE [SEDIMENTATION RATE] IN BLOOD BY WESTERGREN METHOD: 22 MM/HR
GLIADIN IGA SER QL: <5 UNITS
GLIADIN IGG SER QL: <5 UNITS
GLIADIN PEPTIDE IGA SER-ACNC: NEGATIVE
GLIADIN PEPTIDE IGG SER-ACNC: NEGATIVE
GLUCOSE SERPL-MCNC: 114 MG/DL
HCT VFR BLD CALC: 41.5 %
HDLC SERPL-MCNC: 56 MG/DL
HGB BLD-MCNC: 13.7 G/DL
IMM GRANULOCYTES NFR BLD AUTO: 0.5 %
LDLC SERPL CALC-MCNC: 140 MG/DL
LYMPHOCYTES # BLD AUTO: 2.59 K/UL
LYMPHOCYTES NFR BLD AUTO: 29.4 %
MAN DIFF?: NORMAL
MCHC RBC-ENTMCNC: 29.7 PG
MCHC RBC-ENTMCNC: 33 G/DL
MCV RBC AUTO: 90 FL
MONOCYTES # BLD AUTO: 0.53 K/UL
MONOCYTES NFR BLD AUTO: 6 %
NEUTROPHILS # BLD AUTO: 5.4 K/UL
NEUTROPHILS NFR BLD AUTO: 61.4 %
NONHDLC SERPL-MCNC: 185 MG/DL
PLATELET # BLD AUTO: 253 K/UL
POTASSIUM SERPL-SCNC: 4.8 MMOL/L
PROT SERPL-MCNC: 7.2 G/DL
RBC # BLD: 4.61 M/UL
RBC # FLD: 12.5 %
RF+CCP IGG SER-IMP: NEGATIVE
RHEUMATOID FACT SER QL: 11 IU/ML
SODIUM SERPL-SCNC: 141 MMOL/L
TRIGL SERPL-MCNC: 227 MG/DL
TSH SERPL-ACNC: 2.97 UIU/ML
TTG IGA SER IA-ACNC: <1.2 U/ML
TTG IGA SER-ACNC: NEGATIVE
TTG IGG SER IA-ACNC: <1.2 U/ML
TTG IGG SER IA-ACNC: NEGATIVE
WBC # FLD AUTO: 8.8 K/UL

## 2022-05-25 ENCOUNTER — LABORATORY RESULT (OUTPATIENT)
Age: 57
End: 2022-05-25

## 2022-05-25 LAB — HLA-B27 RELATED AG QL: NEGATIVE

## 2022-05-31 DIAGNOSIS — R79.89 OTHER SPECIFIED ABNORMAL FINDINGS OF BLOOD CHEMISTRY: ICD-10-CM

## 2022-05-31 DIAGNOSIS — R10.9 UNSPECIFIED ABDOMINAL PAIN: ICD-10-CM

## 2022-05-31 LAB
25(OH)D3 SERPL-MCNC: 18 NG/ML
ALBUMIN MFR SERPL ELPH: 61.7 %
ALBUMIN SERPL-MCNC: 4.3 G/DL
ALBUMIN/GLOB SERPL: 1.7 RATIO
ALPHA1 GLOB MFR SERPL ELPH: 4.6 %
ALPHA1 GLOB SERPL ELPH-MCNC: 0.3 G/DL
ALPHA2 GLOB MFR SERPL ELPH: 9.5 %
ALPHA2 GLOB SERPL ELPH-MCNC: 0.7 G/DL
B-GLOBULIN MFR SERPL ELPH: 11.8 %
B-GLOBULIN SERPL ELPH-MCNC: 0.8 G/DL
CORTIS SERPL-MCNC: 3.3 UG/DL
CRP SERPL-MCNC: 8 MG/L
FOLATE SERPL-MCNC: 16.9 NG/ML
GAMMA GLOB FLD ELPH-MCNC: 0.9 G/DL
GAMMA GLOB MFR SERPL ELPH: 12.4 %
HBV CORE IGM SER QL: NONREACTIVE
HBV SURFACE AG SER QL: NONREACTIVE
HCV AB SER QL: NONREACTIVE
HCV S/CO RATIO: 0.15 S/CO
HETEROPH AB SER QL: NEGATIVE
HOMOCYSTEINE LEVEL: 15.9 UMOL/L
INTERPRETATION SERPL IEP-IMP: NORMAL
METHYLMALONIC ACID LEVEL: 190 NMOL/L
PROT SERPL-MCNC: 6.9 G/DL
PROT SERPL-MCNC: 6.9 G/DL
VIT B12 SERPL-MCNC: 303 PG/ML

## 2022-05-31 RX ORDER — PREGABALIN 75 MG/1
75 CAPSULE ORAL TWICE DAILY
Qty: 60 | Refills: 1 | Status: DISCONTINUED | COMMUNITY
Start: 2022-05-05 | End: 2022-05-31

## 2022-05-31 RX ORDER — ERGOCALCIFEROL 1.25 MG/1
1.25 MG CAPSULE ORAL
Qty: 8 | Refills: 0 | Status: ACTIVE | COMMUNITY
Start: 2022-05-31 | End: 1900-01-01

## 2022-07-07 DIAGNOSIS — M25.519 PAIN IN UNSPECIFIED SHOULDER: ICD-10-CM

## 2022-07-14 PROBLEM — M25.519 SHOULDER PAIN: Status: ACTIVE | Noted: 2022-05-05

## 2022-07-14 RX ORDER — PREGABALIN 150 MG/1
150 CAPSULE ORAL TWICE DAILY
Qty: 60 | Refills: 1 | Status: DISCONTINUED | COMMUNITY
Start: 2022-05-31 | End: 2022-07-14

## 2022-07-14 RX ORDER — PREGABALIN 200 MG/1
200 CAPSULE ORAL EVERY 8 HOURS
Qty: 90 | Refills: 2 | Status: ACTIVE | COMMUNITY
Start: 2022-07-14 | End: 1900-01-01

## 2022-08-17 ENCOUNTER — NON-APPOINTMENT (OUTPATIENT)
Age: 57
End: 2022-08-17

## 2022-09-19 ENCOUNTER — APPOINTMENT (OUTPATIENT)
Dept: ORTHOPEDIC SURGERY | Facility: CLINIC | Age: 57
End: 2022-09-19

## 2022-09-19 PROCEDURE — 20610 DRAIN/INJ JOINT/BURSA W/O US: CPT | Mod: 50

## 2022-09-19 RX ADMIN — TRIAMCINOLONE ACETONIDE 1 MG/ML: 40 SUSPENSION INTRA-ARTERIAL; INTRAMUSCULAR at 00:00

## 2022-09-19 NOTE — HISTORY OF PRESENT ILLNESS
[de-identified] : 56 year old female presents to the office for bilateral cortisone injections to her arthritic and painful knees.

## 2022-10-17 RX ORDER — PREGABALIN 200 MG/1
200 CAPSULE ORAL EVERY 8 HOURS
Qty: 90 | Refills: 5 | Status: ACTIVE | COMMUNITY
Start: 2022-10-17 | End: 1900-01-01

## 2022-11-17 RX ORDER — PREGABALIN 200 MG/1
200 CAPSULE ORAL EVERY 8 HOURS
Qty: 90 | Refills: 5 | Status: ACTIVE | COMMUNITY
Start: 2022-11-17 | End: 1900-01-01

## 2022-11-21 ENCOUNTER — APPOINTMENT (OUTPATIENT)
Dept: RHEUMATOLOGY | Facility: CLINIC | Age: 57
End: 2022-11-21

## 2023-01-25 ENCOUNTER — APPOINTMENT (OUTPATIENT)
Dept: RHEUMATOLOGY | Facility: CLINIC | Age: 58
End: 2023-01-25
Payer: MEDICARE

## 2023-01-25 VITALS
OXYGEN SATURATION: 98 % | SYSTOLIC BLOOD PRESSURE: 140 MMHG | WEIGHT: 235 LBS | HEIGHT: 62 IN | HEART RATE: 103 BPM | DIASTOLIC BLOOD PRESSURE: 82 MMHG | BODY MASS INDEX: 43.24 KG/M2

## 2023-01-25 DIAGNOSIS — M25.562 PAIN IN LEFT KNEE: ICD-10-CM

## 2023-01-25 DIAGNOSIS — R91.1 SOLITARY PULMONARY NODULE: ICD-10-CM

## 2023-01-25 DIAGNOSIS — R79.89 OTHER SPECIFIED ABNORMAL FINDINGS OF BLOOD CHEMISTRY: ICD-10-CM

## 2023-01-25 DIAGNOSIS — R53.83 OTHER FATIGUE: ICD-10-CM

## 2023-01-25 PROCEDURE — 20550 NJX 1 TENDON SHEATH/LIGAMENT: CPT

## 2023-01-25 PROCEDURE — 99214 OFFICE O/P EST MOD 30 MIN: CPT | Mod: 25

## 2023-01-25 NOTE — HISTORY OF PRESENT ILLNESS
[FreeTextEntry1] : Pt has been experiencing severe R lateral elbow pain which she attributes to taking care of her sister, who has cancer, worse with supinating her arm and with extending her 3rd and 4th fingers. She has had similar symptoms with lateral epicondylitis in the past, and received steroid injections with improvement. Also + continued b/l knee pain; she used to have injections done with Dr. Rodas with improvement of symptoms, but she fell on her knees recently and has hematomas and burning pain so she wanted to wait until the hematomas resolve to have repeat injections. She does note the pain worsened after she ran out of the Lyrica ?1.5 weeks ago; pt does not recall when she ran out of it. + Continued lack of appetite, + cold extremities. Pt did not see the endocrinologist because she has not had time due to taking care of her sister.\par \par Previous HPI: Pt has had years of pain in her upper and lower back, shoulders and knees, with difficulty performing ADLs such as cleaning. She was reportedly diagnosed with systemic lupus erythematosus many years ago, and was treated with Plaquenil, Cellcept and methotrexate, but her symptoms did not improve, and she was subsequently told she may have fibromyalgia? Her mother passed away in Fall 2021, and since that time pt gained 50 lbs, and also noticed hair growing on her face, so she is concerned she may have an underlying medical condition. Also + very dry skin. Denies diarrhea, bloody stool, Raynaud's, oral ulcers, other rashes. \par \par Pt is currently taking gabapentin for pain but feels that it is not helping her. \par \par + Long-standing difficulty sleeping, denies snoring. \par \par + 4 miscarriages.\par \par Physical exam: GEN: AAO woman sitting in chair in NAD\par SKIN: + Dry skin, no rashes\par MOUTH: Moist mucous membranes, + dentures, no oral ulcers, normal oral aperture\par ENT: no LAD\par HEAD: no alopecia\par PULM: Clear to auscultation b/l\par CV: Regular rate and rhythm, no murmurs\par MSK:\par Neck: + Pain with lateral deviation b/l but with full ROM\par Shoulders: + Limited abduction to approx 120 degrees b/l, + Apley scratch test b/l\par Elbows: + TTP in R lateral epicondyle\par Wrists: Full ROM b/l, no effusions\par Hands: + Heberden's nodes b/l, + pain in R lateral epicondyle with extension of R 3rd and 4th fingers\par Hips: Full ROM b/l\par Knees: + Bony hypertrophy b/l, full ROM b/l, no effusions, + R>L medial hematomas\par Ankles: no effusions, full ROM b/l\par Feet: no effusions, no TTP\par EXT: no LE edema b/l

## 2023-01-25 NOTE — ASSESSMENT
[FreeTextEntry1] : R lateral epicondyle pain: Pt's symptoms and exam are classic for lateral epicondylitis.\par - Injected R lateral epicondyle with triamcinolone today\par - Advised pt to wear a counterforce brace\par \par Joint pain/weight gain/hirsutism: Pt's physical exam is suggestive of GH and knee arthritis, likely osteoarthritis based on her symptoms; also with e/o OA on hand exam, although she denies any hand pain or stiffness. With labs not demonstrating any findings to explain her weight gain; however she did have low cortisol levels on two repeat sets of tests.\par - Re-referred pt to endocrinology for low cortisol\par - Continue Lyrica 200 mg TID\par \par Lung nodule: pt had a 25 mm RLL opacity on a chest CT in May 2022 which was done to follow up an elevated CRP level; per report she was recommended to have a f/u noncontrast chest CT in 6-12 months.\par - Referred pt for noncontrast chest CT

## 2023-01-25 NOTE — PROCEDURE
[Today's Date:] : Date: [unfilled] [Patient] : the patient [Therapeutic] : therapeutic [#1 Site: ______] : #1 site identified in the [unfilled] [Ethyl Chloride] : ethyl chloride [Chlorhexidine] : chlorhexidine [Tolerated Well] : the patient tolerated the procedure well [No Complications] : there were no complications [de-identified] : 27 gauge 1.5 inch [de-identified] : 0.5 mL of 40 mg/mL triamcinolone mixed with 2% lidocaine

## 2023-03-08 ENCOUNTER — APPOINTMENT (OUTPATIENT)
Dept: RHEUMATOLOGY | Facility: CLINIC | Age: 58
End: 2023-03-08
Payer: MEDICARE

## 2023-03-08 VITALS
DIASTOLIC BLOOD PRESSURE: 80 MMHG | HEIGHT: 62 IN | SYSTOLIC BLOOD PRESSURE: 134 MMHG | OXYGEN SATURATION: 96 % | HEART RATE: 99 BPM

## 2023-03-08 PROCEDURE — 20610 DRAIN/INJ JOINT/BURSA W/O US: CPT | Mod: RT

## 2023-03-08 PROCEDURE — 99214 OFFICE O/P EST MOD 30 MIN: CPT | Mod: 25

## 2023-03-08 NOTE — PROCEDURE
[Today's Date:] : Date: [unfilled] [Therapeutic] : therapeutic [#1 Site: ______] : #1 site identified in the [unfilled] [#2 Site: ___] : # 2 site identified in the [unfilled] [Ethyl Chloride] : ethyl chloride [Betadine] : with betadine solution [Tolerated Well] : the patient tolerated the procedure well [No Complications] : there were no complications [de-identified] : 18 gauge 1.5 inch [de-identified] : 1 mL of 40 mg/mL triamcinolone mixed with 1 mL of 2% lidocaine

## 2023-03-08 NOTE — HISTORY OF PRESENT ILLNESS
[FreeTextEntry1] : Pt continues to experience severe R lateral elbow pain, with numbness radiating into her medial 2 fingers and pain shooting into her forearm. She is not sure if the steroid injection helped, and she has not been wearing the counterforce brace consistently because it is difficult to wear with winter clothes. Also + continued b/l knee pain which limits her ability to walk, with significant worsening of R knee pain recently. + b/l heel pain with standing up in the morning, somewhat better with wearing orthotics.\par \par Previous HPI: Pt has had years of pain in her upper and lower back, shoulders and knees, with difficulty performing ADLs such as cleaning. She was reportedly diagnosed with systemic lupus erythematosus many years ago, and was treated with Plaquenil, Cellcept and methotrexate, but her symptoms did not improve, and she was subsequently told she may have fibromyalgia? Her mother passed away in Fall 2021, and since that time pt gained 50 lbs, and also noticed hair growing on her face, so she is concerned she may have an underlying medical condition. Also + very dry skin. Denies diarrhea, bloody stool, Raynaud's, oral ulcers, other rashes. \par \par Pt has been experiencing severe R lateral elbow pain which she attributes to taking care of her sister, who has cancer, worse with supinating her arm and with extending her 3rd and 4th fingers. She has had similar symptoms with lateral epicondylitis in the past, and received steroid injections with improvement. Also + continued b/l knee pain; she used to have injections done with Dr. Rodas. + Continued lack of appetite, + cold extremities. \par \par Pt is currently taking gabapentin for pain but feels that it is not helping her. \par \par + Long-standing difficulty sleeping, denies snoring. \par \par + 4 miscarriages.\par \par Physical exam: GEN: AAO woman sitting in chair in NAD\par SKIN: + Dry skin, no rashes\par MOUTH: Moist mucous membranes, + dentures, no oral ulcers, normal oral aperture\par ENT: no LAD\par HEAD: no alopecia\par PULM: Clear to auscultation b/l\par CV: Regular rate and rhythm, no murmurs\par MSK:\par Neck: + Pain with lateral deviation b/l but with full ROM\par Shoulders: + Limited abduction to approx 120 degrees b/l, + Apley scratch test b/l\par Elbows: + TTP in R lateral epicondyle, no effusions\par Wrists: Full ROM b/l, no effusions\par Hands: + Heberden's nodes b/l, + pain in R lateral epicondyle with extension of R 3rd and 4th fingers\par Hips: Full ROM b/l\par Knees: + Bony hypertrophy b/l, full ROM b/l, no effusions\par Ankles: no effusions, full ROM b/l\par Feet: no effusions, no TTP\par EXT: no LE edema b/l

## 2023-04-24 ENCOUNTER — NON-APPOINTMENT (OUTPATIENT)
Age: 58
End: 2023-04-24

## 2023-04-28 ENCOUNTER — RX RENEWAL (OUTPATIENT)
Age: 58
End: 2023-04-28

## 2023-04-28 RX ORDER — PREGABALIN 200 MG/1
200 CAPSULE ORAL EVERY 8 HOURS
Qty: 270 | Refills: 1 | Status: ACTIVE | COMMUNITY
Start: 2023-01-25 | End: 1900-01-01

## 2023-05-17 ENCOUNTER — APPOINTMENT (OUTPATIENT)
Dept: RHEUMATOLOGY | Facility: CLINIC | Age: 58
End: 2023-05-17

## 2023-05-23 ENCOUNTER — APPOINTMENT (OUTPATIENT)
Dept: RHEUMATOLOGY | Facility: CLINIC | Age: 58
End: 2023-05-23

## 2023-06-01 ENCOUNTER — RX RENEWAL (OUTPATIENT)
Age: 58
End: 2023-06-01

## 2023-06-02 RX ORDER — PREGABALIN 200 MG/1
200 CAPSULE ORAL EVERY 8 HOURS
Qty: 270 | Refills: 1 | Status: ACTIVE | COMMUNITY
Start: 2023-06-02 | End: 1900-01-01

## 2023-06-08 ENCOUNTER — APPOINTMENT (OUTPATIENT)
Dept: RHEUMATOLOGY | Facility: CLINIC | Age: 58
End: 2023-06-08
Payer: MEDICARE

## 2023-06-08 VITALS
DIASTOLIC BLOOD PRESSURE: 103 MMHG | SYSTOLIC BLOOD PRESSURE: 145 MMHG | HEART RATE: 87 BPM | BODY MASS INDEX: 43.24 KG/M2 | HEIGHT: 62 IN | WEIGHT: 235 LBS

## 2023-06-08 PROCEDURE — 20551 NJX 1 TENDON ORIGIN/INSJ: CPT | Mod: LT

## 2023-06-08 PROCEDURE — 99214 OFFICE O/P EST MOD 30 MIN: CPT | Mod: 25

## 2023-06-08 PROCEDURE — 20610 DRAIN/INJ JOINT/BURSA W/O US: CPT | Mod: 50

## 2023-06-08 NOTE — PROCEDURE
[Today's Date:] : Date: [unfilled] [Patient] : the patient [Therapeutic] : therapeutic [#1 Site: ______] : #1 site identified in the [unfilled] [#2 Site: ___] : # 2 site identified in the [unfilled] [#3 Site: ______] : # 3 site identified in the [unfilled] [Ethyl Chloride] : ethyl chloride [Chlorhexidine] : chlorhexidine [25 gauge 1.5 inch] : A 25 gauge 1.5 inch needle was used [Tolerated Well] : the patient tolerated the procedure well [No Complications] : there were no complications [de-identified] : 18 gauge 1.5 inch [de-identified] : 1 mL of 40 mg/mL triamcinolone mixed with 1% lidocaine

## 2023-06-08 NOTE — ASSESSMENT
[FreeTextEntry1] : R lateral epicondyle pain: Pt's symptoms and exam are classic for lateral epicondylitis. With no significant improvement with steroid injection. Pt has not been wearing a brace because she finds it cumbersome. s/p R elbow x-ray demonstrating degenerative changes.\par - Injected R lateral epicondyle with triamcinolone again\par \par Joint pain/weight gain/hirsutism: Pt's physical exam is suggestive of GH and knee arthritis, likely osteoarthritis based on her symptoms; also with e/o OA on hand exam, although she denies any hand pain or stiffness. With labs not demonstrating any findings to explain her weight gain; however she did have low cortisol levels on two repeat sets of tests. Pt has known advanced OA especially in the medial compartment; previous improvement with steroid injections and no improvement with Supartz injections.\par - Previously referred pt to endocrinology for low cortisol\par - Continue Lyrica 200 mg TID\par - Injected b/l knees with 40 mg triamcinolone per knee today\par - Referred pt to pain management again for evaluation for possible genicular nerve block; unclear if pt has had one in the past\par \par f/u in 3 months

## 2023-06-08 NOTE — HISTORY OF PRESENT ILLNESS
[FreeTextEntry1] : Pt noticed some improvement after the steroid injections during her last appointment, but now she feels like her knee pain has significantly worsened, and she can't walk and has been gaining weight because she can't exercise. + Also continued L lateral elbow pain, with continued numbness in her medial 2 fingers and also more recently in her 3rd finger as well. The prn naproxen has been helping.\par \par Previous treatments:\par - HA injections for knees didn't help\par - Had ?genicular nerve block with no improvement - unclear\par - Was told that she needs knee replacements but does not wish to have them done at this time\par - Gabapentin - didn't help\par - Lyrica 200 mg q8 hours - helps somewhat\par - Naproxen helps\par - Steroid injections help transiently\par \par Previous HPI: Pt has had years of pain in her upper and lower back, shoulders and knees, with difficulty performing ADLs such as cleaning. She was reportedly diagnosed with systemic lupus erythematosus many years ago, and was treated with Plaquenil, Cellcept and methotrexate, but her symptoms did not improve, and she was subsequently told she may have fibromyalgia? Her mother passed away in Fall 2021, and since that time pt gained 50 lbs, and also noticed hair growing on her face, so she is concerned she may have an underlying medical condition. Also + very dry skin. Denies diarrhea, bloody stool, Raynaud's, oral ulcers, other rashes. \par \par Pt has been experiencing severe R lateral elbow pain which she attributes to taking care of her sister, who has cancer, worse with supinating her arm and with extending her 3rd and 4th fingers. She has had similar symptoms with lateral epicondylitis in the past, and received steroid injections with improvement. Also + continued b/l knee pain; she used to have injections done with Dr. Rodas. + Continued lack of appetite, + cold extremities. \par \par Pt is currently taking gabapentin for pain but feels that it is not helping her. \par \par + Long-standing difficulty sleeping, denies snoring. \par \par + 4 miscarriages.\par \par Physical exam: GEN: AAO woman sitting on exam table in NAD\par SKIN: + Dry skin, no rashes\par MOUTH: Moist mucous membranes, + dentures, no oral ulcers, normal oral aperture\par ENT: no LAD\par HEAD: no alopecia\par PULM: Clear to auscultation b/l\par CV: Regular rate and rhythm, no murmurs\par MSK:\par Neck: + Pain with lateral deviation b/l but with full ROM\par Shoulders: + Limited abduction to approx 120 degrees b/l, + Apley scratch test b/l\par Elbows: + TTP in R lateral epicondyle, no effusions\par Wrists: Full ROM b/l, no effusions\par Hands: + Heberden's nodes b/l, + pain in R lateral epicondyle with extension of R 3rd and 4th fingers\par Hips: Full ROM b/l\par Knees: + Bony hypertrophy b/l, full ROM b/l, no effusions\par Ankles: no effusions, full ROM b/l\par Feet: no effusions, no TTP\par EXT: no LE edema b/l

## 2023-06-23 DIAGNOSIS — T14.8XXA OTHER INJURY OF UNSPECIFIED BODY REGION, INITIAL ENCOUNTER: ICD-10-CM

## 2023-06-23 DIAGNOSIS — M77.11 LATERAL EPICONDYLITIS, RIGHT ELBOW: ICD-10-CM

## 2023-07-12 ENCOUNTER — APPOINTMENT (OUTPATIENT)
Dept: CARDIOLOGY | Facility: CLINIC | Age: 58
End: 2023-07-12

## 2023-07-17 ENCOUNTER — RX RENEWAL (OUTPATIENT)
Age: 58
End: 2023-07-17

## 2023-08-10 ENCOUNTER — NON-APPOINTMENT (OUTPATIENT)
Age: 58
End: 2023-08-10

## 2023-08-15 ENCOUNTER — APPOINTMENT (OUTPATIENT)
Dept: RHEUMATOLOGY | Facility: CLINIC | Age: 58
End: 2023-08-15

## 2023-08-28 ENCOUNTER — RX RENEWAL (OUTPATIENT)
Age: 58
End: 2023-08-28

## 2023-08-31 ENCOUNTER — APPOINTMENT (OUTPATIENT)
Dept: RHEUMATOLOGY | Facility: CLINIC | Age: 58
End: 2023-08-31

## 2023-09-13 ENCOUNTER — APPOINTMENT (OUTPATIENT)
Dept: ORTHOPEDIC SURGERY | Facility: CLINIC | Age: 58
End: 2023-09-13

## 2023-10-18 ENCOUNTER — APPOINTMENT (OUTPATIENT)
Dept: ORTHOPEDIC SURGERY | Facility: CLINIC | Age: 58
End: 2023-10-18
Payer: MEDICARE

## 2023-10-18 ENCOUNTER — RESULT REVIEW (OUTPATIENT)
Age: 58
End: 2023-10-18

## 2023-10-18 ENCOUNTER — OUTPATIENT (OUTPATIENT)
Dept: OUTPATIENT SERVICES | Facility: HOSPITAL | Age: 58
LOS: 1 days | End: 2023-10-18
Payer: MEDICARE

## 2023-10-18 VITALS
DIASTOLIC BLOOD PRESSURE: 77 MMHG | SYSTOLIC BLOOD PRESSURE: 158 MMHG | HEART RATE: 95 BPM | BODY MASS INDEX: 43.24 KG/M2 | OXYGEN SATURATION: 97 % | HEIGHT: 62 IN | WEIGHT: 235 LBS

## 2023-10-18 DIAGNOSIS — M17.11 UNILATERAL PRIMARY OSTEOARTHRITIS, RIGHT KNEE: ICD-10-CM

## 2023-10-18 DIAGNOSIS — M25.561 PAIN IN RIGHT KNEE: ICD-10-CM

## 2023-10-18 DIAGNOSIS — M17.12 UNILATERAL PRIMARY OSTEOARTHRITIS, LEFT KNEE: ICD-10-CM

## 2023-10-18 PROCEDURE — 72170 X-RAY EXAM OF PELVIS: CPT | Mod: 26

## 2023-10-18 PROCEDURE — 99215 OFFICE O/P EST HI 40 MIN: CPT

## 2023-10-18 PROCEDURE — 73564 X-RAY EXAM KNEE 4 OR MORE: CPT

## 2023-10-18 PROCEDURE — 72170 X-RAY EXAM OF PELVIS: CPT

## 2023-10-18 PROCEDURE — 73564 X-RAY EXAM KNEE 4 OR MORE: CPT | Mod: 26,50

## 2023-10-19 RX ORDER — PREGABALIN 300 MG/1
CAPSULE ORAL
Refills: 0 | Status: ACTIVE | COMMUNITY

## 2023-10-24 ENCOUNTER — APPOINTMENT (OUTPATIENT)
Dept: ORTHOPEDIC SURGERY | Facility: CLINIC | Age: 58
End: 2023-10-24

## 2023-10-27 ENCOUNTER — APPOINTMENT (OUTPATIENT)
Dept: ORTHOPEDIC SURGERY | Facility: CLINIC | Age: 58
End: 2023-10-27
Payer: MEDICARE

## 2023-10-27 PROCEDURE — 73560 X-RAY EXAM OF KNEE 1 OR 2: CPT | Mod: 50

## 2023-10-27 PROCEDURE — 99203 OFFICE O/P NEW LOW 30 MIN: CPT

## 2023-11-02 RX ORDER — PREGABALIN 200 MG/1
200 CAPSULE ORAL EVERY 8 HOURS
Qty: 270 | Refills: 1 | Status: DISCONTINUED | COMMUNITY
Start: 2023-10-25 | End: 2023-11-02

## 2023-12-04 ENCOUNTER — NON-APPOINTMENT (OUTPATIENT)
Age: 58
End: 2023-12-04

## 2024-01-29 ENCOUNTER — APPOINTMENT (OUTPATIENT)
Dept: RHEUMATOLOGY | Facility: CLINIC | Age: 59
End: 2024-01-29

## 2024-01-30 ENCOUNTER — RESULT REVIEW (OUTPATIENT)
Age: 59
End: 2024-01-30

## 2024-01-30 ENCOUNTER — OUTPATIENT (OUTPATIENT)
Dept: OUTPATIENT SERVICES | Facility: HOSPITAL | Age: 59
LOS: 1 days | End: 2024-01-30
Payer: MEDICARE

## 2024-01-30 VITALS
HEIGHT: 62 IN | OXYGEN SATURATION: 96 % | TEMPERATURE: 98 F | SYSTOLIC BLOOD PRESSURE: 130 MMHG | RESPIRATION RATE: 18 BRPM | DIASTOLIC BLOOD PRESSURE: 72 MMHG | WEIGHT: 218.26 LBS | HEART RATE: 90 BPM

## 2024-01-30 DIAGNOSIS — M17.11 UNILATERAL PRIMARY OSTEOARTHRITIS, RIGHT KNEE: ICD-10-CM

## 2024-01-30 DIAGNOSIS — Z98.890 OTHER SPECIFIED POSTPROCEDURAL STATES: Chronic | ICD-10-CM

## 2024-01-30 DIAGNOSIS — Z01.818 ENCOUNTER FOR OTHER PREPROCEDURAL EXAMINATION: ICD-10-CM

## 2024-01-30 DIAGNOSIS — Z90.49 ACQUIRED ABSENCE OF OTHER SPECIFIED PARTS OF DIGESTIVE TRACT: Chronic | ICD-10-CM

## 2024-01-30 LAB
A1C WITH ESTIMATED AVERAGE GLUCOSE RESULT: 5.9 % — HIGH (ref 4–5.6)
ALBUMIN SERPL ELPH-MCNC: 4.2 G/DL — SIGNIFICANT CHANGE UP (ref 3.5–5.2)
ALP SERPL-CCNC: 82 U/L — SIGNIFICANT CHANGE UP (ref 30–115)
ALT FLD-CCNC: 12 U/L — SIGNIFICANT CHANGE UP (ref 0–41)
ANION GAP SERPL CALC-SCNC: 13 MMOL/L — SIGNIFICANT CHANGE UP (ref 7–14)
APTT BLD: 29.7 SEC — SIGNIFICANT CHANGE UP (ref 27–39.2)
AST SERPL-CCNC: 19 U/L — SIGNIFICANT CHANGE UP (ref 0–41)
BASOPHILS # BLD AUTO: 0.05 K/UL — SIGNIFICANT CHANGE UP (ref 0–0.2)
BASOPHILS NFR BLD AUTO: 1.1 % — HIGH (ref 0–1)
BILIRUB SERPL-MCNC: 0.3 MG/DL — SIGNIFICANT CHANGE UP (ref 0.2–1.2)
BLD GP AB SCN SERPL QL: SIGNIFICANT CHANGE UP
BUN SERPL-MCNC: 14 MG/DL — SIGNIFICANT CHANGE UP (ref 10–20)
CALCIUM SERPL-MCNC: 9.1 MG/DL — SIGNIFICANT CHANGE UP (ref 8.4–10.5)
CHLORIDE SERPL-SCNC: 104 MMOL/L — SIGNIFICANT CHANGE UP (ref 98–110)
CO2 SERPL-SCNC: 24 MMOL/L — SIGNIFICANT CHANGE UP (ref 17–32)
CREAT SERPL-MCNC: 1 MG/DL — SIGNIFICANT CHANGE UP (ref 0.7–1.5)
EGFR: 65 ML/MIN/1.73M2 — SIGNIFICANT CHANGE UP
EOSINOPHIL # BLD AUTO: 0.29 K/UL — SIGNIFICANT CHANGE UP (ref 0–0.7)
EOSINOPHIL NFR BLD AUTO: 6.5 % — SIGNIFICANT CHANGE UP (ref 0–8)
ESTIMATED AVERAGE GLUCOSE: 123 MG/DL — HIGH (ref 68–114)
GLUCOSE SERPL-MCNC: 90 MG/DL — SIGNIFICANT CHANGE UP (ref 70–99)
HCT VFR BLD CALC: 35.8 % — LOW (ref 37–47)
HGB BLD-MCNC: 11.6 G/DL — LOW (ref 12–16)
IMM GRANULOCYTES NFR BLD AUTO: 0.2 % — SIGNIFICANT CHANGE UP (ref 0.1–0.3)
INR BLD: 0.97 RATIO — SIGNIFICANT CHANGE UP (ref 0.65–1.3)
LYMPHOCYTES # BLD AUTO: 2.18 K/UL — SIGNIFICANT CHANGE UP (ref 1.2–3.4)
LYMPHOCYTES # BLD AUTO: 49 % — SIGNIFICANT CHANGE UP (ref 20.5–51.1)
MCHC RBC-ENTMCNC: 29.5 PG — SIGNIFICANT CHANGE UP (ref 27–31)
MCHC RBC-ENTMCNC: 32.4 G/DL — SIGNIFICANT CHANGE UP (ref 32–37)
MCV RBC AUTO: 91.1 FL — SIGNIFICANT CHANGE UP (ref 81–99)
MONOCYTES # BLD AUTO: 0.3 K/UL — SIGNIFICANT CHANGE UP (ref 0.1–0.6)
MONOCYTES NFR BLD AUTO: 6.7 % — SIGNIFICANT CHANGE UP (ref 1.7–9.3)
MRSA PCR RESULT.: POSITIVE
NEUTROPHILS # BLD AUTO: 1.62 K/UL — SIGNIFICANT CHANGE UP (ref 1.4–6.5)
NEUTROPHILS NFR BLD AUTO: 36.5 % — LOW (ref 42.2–75.2)
NRBC # BLD: 0 /100 WBCS — SIGNIFICANT CHANGE UP (ref 0–0)
PLATELET # BLD AUTO: 164 K/UL — SIGNIFICANT CHANGE UP (ref 130–400)
PMV BLD: 14.5 FL — HIGH (ref 7.4–10.4)
POTASSIUM SERPL-MCNC: 4.5 MMOL/L — SIGNIFICANT CHANGE UP (ref 3.5–5)
POTASSIUM SERPL-SCNC: 4.5 MMOL/L — SIGNIFICANT CHANGE UP (ref 3.5–5)
PROT SERPL-MCNC: 6.2 G/DL — SIGNIFICANT CHANGE UP (ref 6–8)
PROTHROM AB SERPL-ACNC: 11 SEC — SIGNIFICANT CHANGE UP (ref 9.95–12.87)
RBC # BLD: 3.93 M/UL — LOW (ref 4.2–5.4)
RBC # FLD: 12.5 % — SIGNIFICANT CHANGE UP (ref 11.5–14.5)
SODIUM SERPL-SCNC: 141 MMOL/L — SIGNIFICANT CHANGE UP (ref 135–146)
WBC # BLD: 4.45 K/UL — LOW (ref 4.8–10.8)
WBC # FLD AUTO: 4.45 K/UL — LOW (ref 4.8–10.8)

## 2024-01-30 PROCEDURE — 85730 THROMBOPLASTIN TIME PARTIAL: CPT

## 2024-01-30 PROCEDURE — 73562 X-RAY EXAM OF KNEE 3: CPT | Mod: 26,RT

## 2024-01-30 PROCEDURE — 86900 BLOOD TYPING SEROLOGIC ABO: CPT

## 2024-01-30 PROCEDURE — 71046 X-RAY EXAM CHEST 2 VIEWS: CPT | Mod: 26

## 2024-01-30 PROCEDURE — 86850 RBC ANTIBODY SCREEN: CPT

## 2024-01-30 PROCEDURE — 87640 STAPH A DNA AMP PROBE: CPT

## 2024-01-30 PROCEDURE — 72170 X-RAY EXAM OF PELVIS: CPT

## 2024-01-30 PROCEDURE — 93005 ELECTROCARDIOGRAM TRACING: CPT

## 2024-01-30 PROCEDURE — 36415 COLL VENOUS BLD VENIPUNCTURE: CPT

## 2024-01-30 PROCEDURE — 86901 BLOOD TYPING SEROLOGIC RH(D): CPT

## 2024-01-30 PROCEDURE — 80053 COMPREHEN METABOLIC PANEL: CPT

## 2024-01-30 PROCEDURE — 87641 MR-STAPH DNA AMP PROBE: CPT

## 2024-01-30 PROCEDURE — 85025 COMPLETE CBC W/AUTO DIFF WBC: CPT

## 2024-01-30 PROCEDURE — 93010 ELECTROCARDIOGRAM REPORT: CPT

## 2024-01-30 PROCEDURE — 71046 X-RAY EXAM CHEST 2 VIEWS: CPT

## 2024-01-30 PROCEDURE — 99214 OFFICE O/P EST MOD 30 MIN: CPT | Mod: 25

## 2024-01-30 PROCEDURE — 72170 X-RAY EXAM OF PELVIS: CPT | Mod: 26

## 2024-01-30 PROCEDURE — 73562 X-RAY EXAM OF KNEE 3: CPT | Mod: RT

## 2024-01-30 PROCEDURE — 83036 HEMOGLOBIN GLYCOSYLATED A1C: CPT

## 2024-01-30 PROCEDURE — 85610 PROTHROMBIN TIME: CPT

## 2024-01-30 NOTE — H&P PST ADULT - HISTORY OF COVID-19 VACCINATION
Yes Include Pregnancy/Lactation Warning?: No Detail Level: Simple Azithromycin Counseling:  I discussed with the patient the risks of azithromycin including but not limited to GI upset, allergic reaction, drug rash, diarrhea, and yeast infections. Azithromycin Pregnancy And Lactation Text: This medication is considered safe during pregnancy and is also secreted in breast milk. Bactrim Counseling:  I discussed with the patient the risks of sulfa antibiotics including but not limited to GI upset, allergic reaction, drug rash, diarrhea, dizziness, photosensitivity, and yeast infections.  Rarely, more serious reactions can occur including but not limited to aplastic anemia, agranulocytosis, methemoglobinemia, blood dyscrasias, liver or kidney failure, lung infiltrates or desquamative/blistering drug rashes. Bactrim Pregnancy And Lactation Text: This medication is Pregnancy Category D and is known to cause fetal risk.  It is also excreted in breast milk. Cephalexin Counseling: I counseled the patient regarding use of cephalexin as an antibiotic for prophylactic and/or therapeutic purposes. Cephalexin (commonly prescribed under brand name Keflex) is a cephalosporin antibiotic which is active against numerous classes of bacteria, including most skin bacteria. Side effects may include nausea, diarrhea, gastrointestinal upset, rash, hives, yeast infections, and in rare cases, hepatitis, kidney disease, seizures, fever, confusion, neurologic symptoms, and others. Patients with severe allergies to penicillin medications are cautioned that there is about a 10% incidence of cross-reactivity with cephalosporins. When possible, patients with penicillin allergies should use alternatives to cephalosporins for antibiotic therapy. Cephalexin Pregnancy And Lactation Text: This medication is Pregnancy Category B and considered safe during pregnancy.  It is also excreted in breast milk but can be used safely for shorter doses. Clindamycin Counseling: I counseled the patient regarding use of clindamycin as an antibiotic for prophylactic and/or therapeutic purposes. Clindamycin is active against numerous classes of bacteria, including skin bacteria. Side effects may include nausea, diarrhea, gastrointestinal upset, rash, hives, yeast infections, and in rare cases, colitis. Clindamycin Pregnancy And Lactation Text: This medication can be used in pregnancy if certain situations. Clindamycin is also present in breast milk. Doxycycline Counseling:  Patient counseled regarding possible photosensitivity and increased risk for sunburn.  Patient instructed to avoid sunlight, if possible.  When exposed to sunlight, patients should wear protective clothing, sunglasses, and sunscreen.  The patient was instructed to call the office immediately if the following severe adverse effects occur:  hearing changes, easy bruising/bleeding, severe headache, or vision changes.  The patient verbalized understanding of the proper use and possible adverse effects of doxycycline.  All of the patient's questions and concerns were addressed. Doxycycline Pregnancy And Lactation Text: This medication is Pregnancy Category D and not consider safe during pregnancy. It is also excreted in breast milk but is considered safe for shorter treatment courses. Erythromycin Counseling:  I discussed with the patient the risks of erythromycin including but not limited to GI upset, allergic reaction, drug rash, diarrhea, increase in liver enzymes, and yeast infections. Erythromycin Pregnancy And Lactation Text: This medication is Pregnancy Category B and is considered safe during pregnancy. It is also excreted in breast milk. Metronidazole Counseling:  I discussed with the patient the risks of metronidazole including but not limited to seizures, nausea/vomiting, a metallic taste in the mouth, nausea/vomiting and severe allergy. Metronidazole Pregnancy And Lactation Text: This medication is Pregnancy Category B and considered safe during pregnancy.  It is also excreted in breast milk. Minocycline Counseling: Patient advised regarding possible photosensitivity and discoloration of the teeth, skin, lips, tongue and gums.  Patient instructed to avoid sunlight, if possible.  When exposed to sunlight, patients should wear protective clothing, sunglasses, and sunscreen.  The patient was instructed to call the office immediately if the following severe adverse effects occur:  hearing changes, easy bruising/bleeding, severe headache, or vision changes.  The patient verbalized understanding of the proper use and possible adverse effects of minocycline.  All of the patient's questions and concerns were addressed. Minocycline Pregnancy And Lactation Text: This medication is Pregnancy Category D and not consider safe during pregnancy. It is also excreted in breast milk. Quinolones Counseling:  I discussed with the patient the risks of fluoroquinolones including but not limited to GI upset, allergic reaction, drug rash, diarrhea, dizziness, photosensitivity, yeast infections, liver function test abnormalities, tendonitis/tendon rupture. Quinolones Pregnancy And Lactation Text: This medication is Pregnancy Category C and it isn't know if it is safe during pregnancy. It is also excreted in breast milk. Rifampin Counseling: I discussed with the patient the risks of rifampin including but not limited to liver damage, kidney damage, red-orange body fluids, nausea/vomiting and severe allergy. Rifampin Pregnancy And Lactation Text: This medication is Pregnancy Category C and it isn't know if it is safe during pregnancy. It is also excreted in breast milk and should not be used if you are breast feeding. Tetracycline Counseling: Patient counseled regarding possible photosensitivity and increased risk for sunburn.  Patient instructed to avoid sunlight, if possible.  When exposed to sunlight, patients should wear protective clothing, sunglasses, and sunscreen.  The patient was instructed to call the office immediately if the following severe adverse effects occur:  hearing changes, easy bruising/bleeding, severe headache, or vision changes.  The patient verbalized understanding of the proper use and possible adverse effects of tetracycline.  All of the patient's questions and concerns were addressed. Patient understands to avoid pregnancy while on therapy due to potential birth defects. Fluconazole Counseling:  Patient counseled regarding adverse effects of fluconazole including but not limited to headache, diarrhea, nausea, upset stomach, liver function test abnormalities, taste disturbance, and stomach pain.  There is a rare possibility of liver failure that can occur when taking fluconazole.  The patient understands that monitoring of LFTs and kidney function test may be required, especially at baseline. The patient verbalized understanding of the proper use and possible adverse effects of fluconazole.  All of the patient's questions and concerns were addressed. Griseofulvin Counseling:  I discussed with the patient the risks of griseofulvin including but not limited to photosensitivity, cytopenia, liver damage, nausea/vomiting and severe allergy.  The patient understands that this medication is best absorbed when taken with a fatty meal (e.g., ice cream or french fries). Griseofulvin Pregnancy And Lactation Text: This medication is Pregnancy Category X and is known to cause serious birth defects. It is unknown if this medication is excreted in breast milk but breast feeding should be avoided. Itraconazole Counseling:  I discussed with the patient the risks of itraconazole including but not limited to liver damage, nausea/vomiting, neuropathy, and severe allergy.  The patient understands that this medication is best absorbed when taken with acidic beverages such as non-diet cola or ginger ale.  The patient understands that monitoring is required including baseline LFTs and repeat LFTs at intervals.  The patient understands that they are to contact us or the primary physician if concerning signs are noted. Ketoconazole Counseling:   Patient counseled regarding improving absorption with orange juice.  Adverse effects include but are not limited to breast enlargement, headache, diarrhea, nausea, upset stomach, liver function test abnormalities, taste disturbance, and stomach pain.  There is a rare possibility of liver failure that can occur when taking ketoconazole. The patient understands that monitoring of LFTs may be required, especially at baseline. The patient verbalized understanding of the proper use and possible adverse effects of ketoconazole.  All of the patient's questions and concerns were addressed. Ketoconazole Pregnancy And Lactation Text: This medication is Pregnancy Category C and it isn't know if it is safe during pregnancy. It is also excreted in breast milk and breast feeding isn't recommended. Terbinafine Counseling: Patient counseling regarding adverse effects of terbinafine including but not limited to headache, diarrhea, rash, upset stomach, liver function test abnormalities, itching, taste/smell disturbance, nausea, abdominal pain, and flatulence.  There is a rare possibility of liver failure that can occur when taking terbinafine.  The patient understands that a baseline LFT and kidney function test may be required. The patient verbalized understanding of the proper use and possible adverse effects of terbinafine.  All of the patient's questions and concerns were addressed. Terbinafine Pregnancy And Lactation Text: This medication is Pregnancy Category B and is considered safe during pregnancy. It is also excreted in breast milk and breast feeding isn't recommended. Cimetidine Counseling:  I discussed with the patient the risks of Cimetidine including but not limited to gynecomastia, headache, diarrhea, nausea, drowsiness, arrhythmias, pancreatitis, skin rashes, psychosis, bone marrow suppression and kidney toxicity. Doxepin Counseling:  Patient advised that the medication is sedating and not to drive a car after taking this medication. Patient informed of potential adverse effects including but not limited to dry mouth, urinary retention, and blurry vision.  The patient verbalized understanding of the proper use and possible adverse effects of doxepin.  All of the patient's questions and concerns were addressed. Doxepin Pregnancy And Lactation Text: This medication is Pregnancy Category C and it isn't known if it is safe during pregnancy. It is also excreted in breast milk and breast feeding isn't recommended. Hydroxyzine Counseling: Patient advised that the medication is sedating and not to drive a car after taking this medication.  Patient informed of potential adverse effects including but not limited to dry mouth, urinary retention, and blurry vision.  The patient verbalized understanding of the proper use and possible adverse effects of hydroxyzine.  All of the patient's questions and concerns were addressed. No Hydroxyzine Pregnancy And Lactation Text: This medication is not safe during pregnancy and should not be taken. It is also excreted in breast milk and breast feeding isn't recommended. Albendazole Counseling:  I discussed with the patient the risks of albendazole including but not limited to cytopenia, kidney damage, nausea/vomiting and severe allergy.  The patient understands that this medication is being used in an off-label manner. Albendazole Pregnancy And Lactation Text: This medication is Pregnancy Category C and it isn't known if it is safe during pregnancy. It is also excreted in breast milk. Ivermectin Counseling:  Patient instructed to take medication on an empty stomach with a full glass of water.  Patient informed of potential adverse effects including but not limited to nausea, diarrhea, dizziness, itching, and swelling of the extremities or lymph nodes.  The patient verbalized understanding of the proper use and possible adverse effects of ivermectin.  All of the patient's questions and concerns were addressed. Cimzia Counseling:  I discussed with the patient the risks of Cimzia including but not limited to immunosuppression, allergic reactions and infections.  The patient understands that monitoring is required including a PPD at baseline and must alert us or the primary physician if symptoms of infection or other concerning signs are noted. Cimzia Pregnancy And Lactation Text: This medication crosses the placenta but can be considered safe in certain situations. Cimzia may be excreted in breast milk. Cosentyx Counseling:  I discussed with the patient the risks of Cosentyx including but not limited to worsening of Crohn's disease, immunosuppression, allergic reactions and infections.  The patient understands that monitoring is required including a PPD at baseline and must alert us or the primary physician if symptoms of infection or other concerning signs are noted. Cosentyx Pregnancy And Lactation Text: This medication is Pregnancy Category B and is considered safe during pregnancy. It is unknown if this medication is excreted in breast milk. Dupixent Counseling: I discussed with the patient the risks of dupilumab including but not limited to eye infection and irritation, cold sores, injection site reactions, worsening of asthma, allergic reactions and increased risk of parasitic infection.  Live vaccines should be avoided while taking dupilumab. Dupilumab will also interact with certain medications such as warfarin and cyclosporine. The patient understands that monitoring is required and they must alert us or the primary physician if symptoms of infection or other concerning signs are noted. Dupixent Pregnancy And Lactation Text: This medication likely crosses the placenta but the risk for the fetus is uncertain. This medication is excreted in breast milk. Enbrel Counseling:  I discussed with the patient the risks of etanercept including but not limited to myelosuppression, immunosuppression, autoimmune hepatitis, demyelinating diseases, lymphoma, and infections.  The patient understands that monitoring is required including a PPD at baseline and must alert us or the primary physician if symptoms of infection or other concerning signs are noted. Humira Counseling:  I discussed with the patient the risks of adalimumab including but not limited to myelosuppression, immunosuppression, autoimmune hepatitis, demyelinating diseases, lymphoma, and serious infections.  The patient understands that monitoring is required including a PPD at baseline and must alert us or the primary physician if symptoms of infection or other concerning signs are noted. Ilumya Counseling: I discussed with the patient the risks of tildrakizumab including but not limited to immunosuppression, malignancy, posterior leukoencephalopathy syndrome, and serious infections.  The patient understands that monitoring is required including a PPD at baseline and must alert us or the primary physician if symptoms of infection or other concerning signs are noted. Ilumya Pregnancy And Lactation Text: The risk during pregnancy and breastfeeding is uncertain with this medication. Infliximab Counseling:  I discussed with the patient the risks of infliximab including but not limited to myelosuppression, immunosuppression, autoimmune hepatitis, demyelinating diseases, lymphoma, and serious infections.  The patient understands that monitoring is required including a PPD at baseline and must alert us or the primary physician if symptoms of infection or other concerning signs are noted. Rituxan Counseling:  I discussed with the patient the risks of Rituxan infusions. Side effects can include infusion reactions, severe drug rashes including mucocutaneous reactions, reactivation of latent hepatitis and other infections and rarely progressive multifocal leukoencephalopathy.  All of the patient's questions and concerns were addressed. Rituxan Pregnancy And Lactation Text: This medication is Pregnancy Category C and it isn't know if it is safe during pregnancy. It is unknown if this medication is excreted in breast milk but similar antibodies are known to be excreted. Siliq Counseling:  I discussed with the patient the risks of Siliq including but not limited to new or worsening depression, suicidal thoughts and behavior, immunosuppression, malignancy, posterior leukoencephalopathy syndrome, and serious infections.  The patient understands that monitoring is required including a PPD at baseline and must alert us or the primary physician if symptoms of infection or other concerning signs are noted. There is also a special program designed to monitor depression which is required with Siliq. Simponi Counseling:  I discussed with the patient the risks of golimumab including but not limited to myelosuppression, immunosuppression, autoimmune hepatitis, demyelinating diseases, lymphoma, and serious infections.  The patient understands that monitoring is required including a PPD at baseline and must alert us or the primary physician if symptoms of infection or other concerning signs are noted. Stelara Counseling:  I discussed with the patient the risks of ustekinumab including but not limited to immunosuppression, malignancy, posterior leukoencephalopathy syndrome, and serious infections.  The patient understands that monitoring is required including a PPD at baseline and must alert us or the primary physician if symptoms of infection or other concerning signs are noted. Taltz Counseling: I discussed with the patient the risks of ixekizumab including but not limited to immunosuppression, serious infections, worsening of inflammatory bowel disease and drug reactions.  The patient understands that monitoring is required including a PPD at baseline and must alert us or the primary physician if symptoms of infection or other concerning signs are noted. Tremfya Counseling: I discussed with the patient the risks of guselkumab including but not limited to immunosuppression, serious infections, worsening of inflammatory bowel disease and drug reactions.  The patient understands that monitoring is required including a PPD at baseline and must alert us or the primary physician if symptoms of infection or other concerning signs are noted. Xeljanz Counseling: I discussed with the patient the risks of Xeljanz therapy including increased risk of infection, liver issues, headache, diarrhea, or cold symptoms. Live vaccines should be avoided. They were instructed to call if they have any problems. Xelmaria de jesusz Pregnancy And Lactation Text: This medication is Pregnancy Category D and is not considered safe during pregnancy.  The risk during breast feeding is also uncertain. Xolair Counseling:  Patient informed of potential adverse effects including but not limited to fever, muscle aches, rash and allergic reactions.  The patient verbalized understanding of the proper use and possible adverse effects of Xolair.  All of the patient's questions and concerns were addressed. Xolair Pregnancy And Lactation Text: This medication is Pregnancy Category B and is considered safe during pregnancy. This medication is excreted in breast milk. Azathioprine Counseling:  I discussed with the patient the risks of azathioprine including but not limited to myelosuppression, immunosuppression, hepatotoxicity, lymphoma, and infections.  The patient understands that monitoring is required including baseline LFTs, Creatinine, possible TPMP genotyping and weekly CBCs for the first month and then every 2 weeks thereafter.  The patient verbalized understanding of the proper use and possible adverse effects of azathioprine.  All of the patient's questions and concerns were addressed. Azathioprine Pregnancy And Lactation Text: This medication is Pregnancy Category D and isn't considered safe during pregnancy. It is unknown if this medication is excreted in breast milk. Cellcept Counseling:  I discussed with the patient the risks of mycophenolate mofetil including but not limited to infection/immunosuppression, GI upset, hypokalemia, hypercholesterolemia, bone marrow suppression, lymphoproliferative disorders, malignancy, GI ulceration/bleed/perforation, colitis, interstitial lung disease, kidney failure, progressive multifocal leukoencephalopathy, and birth defects.  The patient understands that monitoring is required including a baseline creatinine and regular CBC testing. In addition, patient must alert us immediately if symptoms of infection or other concerning signs are noted. Cyclophosphamide Counseling:  I discussed with the patient the risks of cyclophosphamide including but not limited to hair loss, hormonal abnormalities, decreased fertility, abdominal pain, diarrhea, nausea and vomiting, bone marrow suppression and infection. The patient understands that monitoring is required while taking this medication. Cyclophosphamide Pregnancy And Lactation Text: This medication is Pregnancy Category D and it isn't considered safe during pregnancy. This medication is excreted in breast milk. Cyclosporine Counseling:  I discussed with the patient the risks of cyclosporine including but not limited to hypertension, gingival hyperplasia,myelosuppression, immunosuppression, liver damage, kidney damage, neurotoxicity, lymphoma, and serious infections. The patient understands that monitoring is required including baseline blood pressure, CBC, CMP, lipid panel and uric acid, and then 1-2 times monthly CMP and blood pressure. Cyclosporine Pregnancy And Lactation Text: This medication is Pregnancy Category C and it isn't know if it is safe during pregnancy. This medication is excreted in breast milk. Methotrexate Counseling:  Patient counseled regarding adverse effects of methotrexate including but not limited to nausea, vomiting, abnormalities in liver function tests. Patients may develop mouth sores, rash, diarrhea, and abnormalities in blood counts. The patient understands that monitoring is required including LFT's and blood counts.  There is a rare possibility of scarring of the liver and lung problems that can occur when taking methotrexate. Persistent nausea, loss of appetite, pale stools, dark urine, cough, and shortness of breath should be reported immediately. Patient advised to discontinue methotrexate treatment at least three months before attempting to become pregnant.  I discussed the need for folate supplements while taking methotrexate.  These supplements can decrease side effects during methotrexate treatment. The patient verbalized understanding of the proper use and possible adverse effects of methotrexate.  All of the patient's questions and concerns were addressed. Methotrexate Pregnancy And Lactation Text: This medication is Pregnancy Category X and is known to cause fetal harm. This medication is excreted in breast milk. Prednisone Counseling:  I discussed with the patient the risks of prolonged use of prednisone including but not limited to weight gain, insomnia, osteoporosis, mood changes, diabetes, susceptibility to infection, glaucoma and high blood pressure.  In cases where prednisone use is prolonged, patients should be monitored with blood pressure checks, serum glucose levels and an eye exam.  Additionally, the patient may need to be placed on GI prophylaxis, PCP prophylaxis, and calcium and vitamin D supplementation and/or a bisphosphonate.  The patient verbalized understanding of the proper use and the possible adverse effects of prednisone.  All of the patient's questions and concerns were addressed. Acitretin Counseling:  I discussed with the patient the risks of acitretin including but not limited to hair loss, dry lips/skin/eyes, liver damage, hyperlipidemia, depression/suicidal ideation, photosensitivity.  Serious rare side effects can include but are not limited to pancreatitis, pseudotumor cerebri, bony changes, clot formation/stroke/heart attack.  Patient understands that alcohol is contraindicated since it can result in liver toxicity and significantly prolong the elimination of the drug by many years. Acitretin Pregnancy And Lactation Text: This medication is Pregnancy Category X and should not be given to women who are pregnant or may become pregnant in the future. This medication is excreted in breast milk. Bexarotene Counseling:  I discussed with the patient the risks of bexarotene including but not limited to hair loss, dry lips/skin/eyes, liver abnormalities, hyperlipidemia, pancreatitis, depression/suicidal ideation, photosensitivity, drug rash/allergic reactions, hypothyroidism, anemia, leukopenia, infection, cataracts, and teratogenicity.  Patient understands that they will need regular blood tests to check lipid profile, liver function tests, white blood cell count, thyroid function tests and pregnancy test if applicable. Bexarotene Pregnancy And Lactation Text: This medication is Pregnancy Category X and should not be given to women who are pregnant or may become pregnant. This medication should not be used if you are breast feeding. Isotretinoin Counseling: Patient should get monthly blood tests, not donate blood, not drive at night if vision affected, not share medication, and not undergo elective surgery for 6 months after tx completed. Side effects reviewed, pt to contact office should one occur. Isotretinoin Pregnancy And Lactation Text: This medication is Pregnancy Category X and is considered extremely dangerous during pregnancy. It is unknown if it is excreted in breast milk. High Dose Vitamin A Counseling: Side effects reviewed, pt to contact office should one occur. High Dose Vitamin A Pregnancy And Lactation Text: High dose vitamin A therapy is contraindicated during pregnancy and breast feeding. Benzoyl Peroxide Counseling: Patient counseled that medicine may cause skin irritation and bleach clothing.  In the event of skin irritation, the patient was advised to reduce the amount of the drug applied or use it less frequently.   The patient verbalized understanding of the proper use and possible adverse effects of benzoyl peroxide.  All of the patient's questions and concerns were addressed. Benzoyl Peroxide Pregnancy And Lactation Text: This medication is Pregnancy Category C. It is unknown if benzoyl peroxide is excreted in breast milk. Carac Counseling:  I discussed with the patient the risks of Carac including but not limited to erythema, scaling, itching, weeping, crusting, and pain. Carac Pregnancy And Lactation Text: This medication is Pregnancy Category X and contraindicated in pregnancy and in women who may become pregnant. It is unknown if this medication is excreted in breast milk. 5-Fu Counseling: 5-Fluorouracil Counseling:  I discussed with the patient the risks of 5-fluorouracil including but not limited to erythema, scaling, itching, weeping, crusting, and pain. Drysol Counseling:  I discussed with the patient the risks of drysol/aluminum chloride including but not limited to skin rash, itching, irritation, burning. Drysol Pregnancy And Lactation Text: This medication is considered safe during pregnancy and breast feeding. Elidel Counseling: Patient may experience a mild burning sensation during topical application. Elidel is not approved in children less than 2 years of age. There have been case reports of hematologic and skin malignancies in patients using topical calcineurin inhibitors although causality is questionable. Elidel Pregnancy And Lactation Text: This medication is Pregnancy Category C. It is unknown if this medication is excreted in breast milk. Eucrisa Counseling: Patient may experience a mild burning sensation during topical application. Eucrisa is not approved in children less than 2 years of age. Eucrisa Pregnancy And Lactation Text: This medication has not been assigned a Pregnancy Risk Category but animal studies failed to show danger with the topical medication. It is unknown if the medication is excreted in breast milk. Hydroquinone Counseling:  Patient advised that medication may result in skin irritation, lightening (hypopigmentation), dryness, and burning.  In the event of skin irritation, the patient was advised to reduce the amount of the drug applied or use it less frequently.  Rarely, spots that are treated with hydroquinone can become darker (pseudoochronosis).  Should this occur, patient instructed to stop medication and call the office. The patient verbalized understanding of the proper use and possible adverse effects of hydroquinone.  All of the patient's questions and concerns were addressed. Imiquimod Counseling:  I discussed with the patient the risks of imiquimod including but not limited to erythema, scaling, itching, weeping, crusting, and pain.  Patient understands that the inflammatory response to imiquimod is variable from person to person and was educated regarded proper titration schedule.  If flu-like symptoms develop, patient knows to discontinue the medication and contact us. Minoxidil Counseling: Minoxidil is a topical medication which can increase blood flow where it is applied. It is uncertain how this medication increases hair growth. Side effects are uncommon and include stinging and allergic reactions. Picato Counseling:  I discussed with the patient the risks of Picato including but not limited to erythema, scaling, itching, weeping, crusting, and pain. Protopic Counseling: Patient may experience a mild burning sensation during topical application. Protopic is not approved in children less than 2 years of age. There have been case reports of hematologic and skin malignancies in patients using topical calcineurin inhibitors although causality is questionable. Protopic Pregnancy And Lactation Text: This medication is Pregnancy Category C. It is unknown if this medication is excreted in breast milk when applied topically. Solaraze Counseling:  I discussed with the patient the risks of Solaraze including but not limited to erythema, scaling, itching, weeping, crusting, and pain. Solaraze Pregnancy And Lactation Text: This medication is Pregnancy Category B and is considered safe. There is some data to suggest avoiding during the third trimester. It is unknown if this medication is excreted in breast milk. Topical Retinoid counseling:  Patient advised to apply a pea-sized amount only at bedtime and wait 30 minutes after washing their face before applying.  If too drying, patient may add a non-comedogenic moisturizer. The patient verbalized understanding of the proper use and possible adverse effects of retinoids.  All of the patient's questions and concerns were addressed. Tazorac Counseling:  Patient advised that medication is irritating and drying.  Patient may need to apply sparingly and wash off after an hour before eventually leaving it on overnight.  The patient verbalized understanding of the proper use and possible adverse effects of tazorac.  All of the patient's questions and concerns were addressed. Tazorac Pregnancy And Lactation Text: This medication is not safe during pregnancy. It is unknown if this medication is excreted in breast milk. Topical Clindamycin Counseling: Patient counseled that this medication may cause skin irritation or allergic reactions.  In the event of skin irritation, the patient was advised to reduce the amount of the drug applied or use it less frequently.   The patient verbalized understanding of the proper use and possible adverse effects of clindamycin.  All of the patient's questions and concerns were addressed. Topical Clindamycin Pregnancy And Lactation Text: This medication is Pregnancy Category B and is considered safe during pregnancy. It is unknown if it is excreted in breast milk. Topical Sulfur Applications Counseling: Topical Sulfur Counseling: Patient counseled that this medication may cause skin irritation or allergic reactions.  In the event of skin irritation, the patient was advised to reduce the amount of the drug applied or use it less frequently.   The patient verbalized understanding of the proper use and possible adverse effects of topical sulfur application.  All of the patient's questions and concerns were addressed. Topical Sulfur Applications Pregnancy And Lactation Text: This medication is Pregnancy Category C and has an unknown safety profile during pregnancy. It is unknown if this topical medication is excreted in breast milk. Wartpeel Counseling:  I discussed with the patient the risks of Wartpeel including but not limited to erythema, scaling, itching, weeping, crusting, and pain. Zyclara Counseling:  I discussed with the patient the risks of imiquimod including but not limited to erythema, scaling, itching, weeping, crusting, and pain.  Patient understands that the inflammatory response to imiquimod is variable from person to person and was educated regarded proper titration schedule.  If flu-like symptoms develop, patient knows to discontinue the medication and contact us. Arava Counseling:  Patient counseled regarding adverse effects of Arava including but not limited to nausea, vomiting, abnormalities in liver function tests. Patients may develop mouth sores, rash, diarrhea, and abnormalities in blood counts. The patient understands that monitoring is required including LFTs and blood counts.  There is a rare possibility of scarring of the liver and lung problems that can occur when taking methotrexate. Persistent nausea, loss of appetite, pale stools, dark urine, cough, and shortness of breath should be reported immediately. Patient advised to discontinue Arava treatment and consult with a physician prior to attempting conception. The patient will have to undergo a treatment to eliminate Arava from the body prior to conception. Arava Pregnancy And Lactation Text: This medication is Pregnancy Category X and is absolutely contraindicated during pregnancy. It is unknown if it is excreted in breast milk. Clofazimine Counseling:  I discussed with the patient the risks of clofazimine including but not limited to skin and eye pigmentation, liver damage, nausea/vomiting, gastrointestinal bleeding and allergy. Clofazimine Pregnancy And Lactation Text: This medication is Pregnancy Category C and isn't considered safe during pregnancy. It is excreted in breast milk. Colchicine Counseling:  Patient counseled regarding adverse effects including but not limited to stomach upset (nausea, vomiting, stomach pain, or diarrhea).  Patient instructed to limit alcohol consumption while taking this medication.  Colchicine may reduce blood counts especially with prolonged use.  The patient understands that monitoring of kidney function and blood counts may be required, especially at baseline. The patient verbalized understanding of the proper use and possible adverse effects of colchicine.  All of the patient's questions and concerns were addressed. Dapsone Counseling: I discussed with the patient the risks of dapsone including but not limited to hemolytic anemia, agranulocytosis, rashes, methemoglobinemia, kidney failure, peripheral neuropathy, headaches, GI upset, and liver toxicity.  Patients who start dapsone require monitoring including baseline LFTs and weekly CBCs for the first month, then every month thereafter.  The patient verbalized understanding of the proper use and possible adverse effects of dapsone.  All of the patient's questions and concerns were addressed. Dapsone Pregnancy And Lactation Text: This medication is Pregnancy Category C and is not considered safe during pregnancy or breast feeding. Erivedge Counseling- I discussed with the patient the risks of Erivedge including but not limited to nausea, vomiting, diarrhea, constipation, weight loss, changes in the sense of taste, decreased appetite, muscle spasms, and hair loss.  The patient verbalized understanding of the proper use and possible adverse effects of Erivedge.  All of the patient's questions and concerns were addressed. Gabapentin Counseling: I discussed with the patient the risks of gabapentin including but not limited to dizziness, somnolence, fatigue and ataxia. Glycopyrrolate Counseling:  I discussed with the patient the risks of glycopyrrolate including but not limited to skin rash, drowsiness, dry mouth, difficulty urinating, and blurred vision. Glycopyrrolate Pregnancy And Lactation Text: This medication is Pregnancy Category B and is considered safe during pregnancy. It is unknown if it is excreted breast milk. Hydroxychloroquine Counseling:  I discussed with the patient that a baseline ophthalmologic exam is needed at the start of therapy and every year thereafter while on therapy. A CBC may also be warranted for monitoring.  The side effects of this medication were discussed with the patient, including but not limited to agranulocytosis, aplastic anemia, seizures, rashes, retinopathy, and liver toxicity. Patient instructed to call the office should any adverse effect occur.  The patient verbalized understanding of the proper use and possible adverse effects of Plaquenil.  All the patient's questions and concerns were addressed. Hydroxychloroquine Pregnancy And Lactation Text: This medication has been shown to cause fetal harm but it isn't assigned a Pregnancy Risk Category. There are small amounts excreted in breast milk. Nsaids Counseling: NSAID Counseling: I discussed with the patient that NSAIDs should be taken with food. Prolonged use of NSAIDs can result in the development of stomach ulcers.  Patient advised to stop taking NSAIDs if abdominal pain occurs.  The patient verbalized understanding of the proper use and possible adverse effects of NSAIDs.  All of the patient's questions and concerns were addressed. Nsaids Pregnancy And Lactation Text: These medications are considered safe up to 30 weeks gestation. It is excreted in breast milk. Odomzo Counseling- I discussed with the patient the risks of Odomzo including but not limited to nausea, vomiting, diarrhea, constipation, weight loss, changes in the sense of taste, decreased appetite, muscle spasms, and hair loss.  The patient verbalized understanding of the proper use and possible adverse effects of Odomzo.  All of the patient's questions and concerns were addressed. Otezla Counseling: The side effects of Otezla were discussed with the patient, including but not limited to worsening or new depression, weight loss, diarrhea, nausea, upper respiratory tract infection, and headache. Patient instructed to call the office should any adverse effect occur.  The patient verbalized understanding of the proper use and possible adverse effects of Otezla.  All the patient's questions and concerns were addressed. Otezla Pregnancy And Lactation Text: This medication is Pregnancy Category C and it isn't known if it is safe during pregnancy. It is unknown if it is excreted in breast milk. Oxybutynin Counseling:  I discussed with the patient the risks of oxybutynin including but not limited to skin rash, drowsiness, dry mouth, difficulty urinating, and blurred vision. Birth Control Pills Counseling: Birth Control Pill Counseling: I discussed with the patient the potential side effects of OCPs including but not limited to increased risk of stroke, heart attack, thrombophlebitis, deep venous thrombosis, hepatic adenomas, breast changes, GI upset, headaches, and depression.  The patient verbalized understanding of the proper use and possible adverse effects of OCPs. All of the patient's questions and concerns were addressed. Birth Control Pills Pregnancy And Lactation Text: This medication should be avoided if pregnant and for the first 30 days post-partum. Spironolactone Counseling: Patient advised regarding risks of diarrhea, abdominal pain, hyperkalemia, birth defects (for female patients), liver toxicity and renal toxicity. The patient may need blood work to monitor liver and kidney function and potassium levels while on therapy. The patient verbalized understanding of the proper use and possible adverse effects of spironolactone.  All of the patient's questions and concerns were addressed. Spironolactone Pregnancy And Lactation Text: This medication can cause feminization of the male fetus and should be avoided during pregnancy. The active metabolite is also found in breast milk. SSKI Counseling:  I discussed with the patient the risks of SSKI including but not limited to thyroid abnormalities, metallic taste, GI upset, fever, headache, acne, arthralgias, paraesthesias, lymphadenopathy, easy bleeding, arrhythmias, and allergic reaction. Sski Pregnancy And Lactation Text: This medication is Pregnancy Category D and isn't considered safe during pregnancy. It is excreted in breast milk. Thalidomide Counseling: I discussed with the patient the risks of thalidomide including but not limited to birth defects, anxiety, weakness, chest pain, dizziness, cough and severe allergy. Valtrex Counseling: I discussed with the patient the risks of valacyclovir including but not limited to kidney damage, nausea, vomiting and severe allergy.  The patient understands that if the infection seems to be worsening or is not improving, they are to call. Valtrex Pregnancy And Lactation Text: this medication is Pregnancy Category B and is considered safe during pregnancy. This medication is not directly found in breast milk but it's metabolite acyclovir is present.

## 2024-01-30 NOTE — H&P PST ADULT - HISTORY OF PRESENT ILLNESS
Patient is a _____ year old ___male presenting to PAST in preparation for ______ on ______ under _______ anesthesia by  _________ .  PATIENT CURRENTLY DENIES CHEST PAIN  SHORTNESS OF BREATH  PALPITATIONS,  DYSURIA, OR UPPER RESPIRATORY INFECTION IN PAST 2 WEEKS    Anesthesia Alert  NO--Difficult Airway  NO--History of neck surgery or radiation  NO--Limited ROM of neck  NO--History of Malignant hyperthermia  NO--Personal or family history of Pseudocholinesterase deficiency  NO--Prior Anesthesia Complication  NO--Latex Allergy  NO--Loose teeth  NO--History of Rheumatoid Arthritis  NO--TURNER  NO-- BLEEDING RISK  NO--Other_____    As per patient, this is their complete medical and surgical history, including medications both prescribed or over the counter.  Patient verbalized understanding of instructions and was given the opportunity to ask questions and have them answered.       Patient is a 58 year old female presenting to PAST in preparation for  Right total knee replacement on 2/5 under regional anesthesia by Dr. Vogel  Reports increased pain to right knee , rates pain 6/10 movement worsens the pain and rest improves the pain, has been advised to have above  PATIENT CURRENTLY DENIES CHEST PAIN  SHORTNESS OF BREATH  PALPITATIONS,  DYSURIA, OR UPPER RESPIRATORY INFECTION IN PAST 2 WEEKS    Anesthesia Alert  NO--Difficult Airway  NO--History of neck surgery or radiation  NO--Limited ROM of neck  NO--History of Malignant hyperthermia  NO--Personal or family history of Pseudocholinesterase deficiency  NO--Prior Anesthesia Complication  NO--Latex Allergy  yes--Loose teeth( upper and lower dentures)  NO--History of Rheumatoid Arthritis  NO--TURNER  NO-- BLEEDING RISK  NO--Other_____    D[facility] Activity Status Index (DASI) from [employer]  on 1/30/2024      RESULT SUMMARY:  17.95 points  The higher the score (maximum 58.2), the higher the functional status.    4.95 METs        INPUTS:  Take care of self —> 2.75 = Yes  Walk indoors —> 1.75 = Yes  Walk 1&ndash;2 blocks on level ground —> 2.75 = Yes  Climb a flight of stairs or walk up a hill —> 0 = No  Run a short distance —> 8 = Yes  Do light work around the house —> 2.7 = Yes  Do moderate work around the house —> 0 = No  Do heavy work around the house —> 0 = No  Do yardwork —> 0 = No  Have sexual relations —> 0 = No  Participate in moderate recreational activities —> 0 = No  Participate in strenuous sports —> 0 = No        Revised Cardiac Risk Index for Pre-Operative Risk from [employer]  on 1/30/2024      RESULT SUMMARY:  0 points  Class I Risk    3.9 %  30-day risk of death, MI, or cardiac arrest    From Duceppe 2017. These numbers are higher than those from the original study (Patrick 1999). See Evidence for details.      INPUTS:  Elevated-risk surgery —> 0 = No  History of ischemic heart disease —> 0 = No  History of congestive heart failure —> 0 = No  History of cerebrovascular disease —> 0 = No  Pre-operative treatment with insulin —> 0 = No  Pre-operative creatinine >2 mg/dL / 176.8 µmol/L —> 0 = No            As per patient, this is their complete medical and surgical history, including medications both prescribed or over the counter.  Patient verbalized understanding of instructions and was given the opportunity to ask questions and have them answered.

## 2024-01-30 NOTE — H&P PST ADULT - NSICDXPASTMEDICALHX_GEN_ALL_CORE_FT
PAST MEDICAL HISTORY:  Right knee pain      PAST MEDICAL HISTORY:  H/O arthroscopy of knee     Right knee pain

## 2024-01-31 DIAGNOSIS — Z01.818 ENCOUNTER FOR OTHER PREPROCEDURAL EXAMINATION: ICD-10-CM

## 2024-01-31 DIAGNOSIS — M17.11 UNILATERAL PRIMARY OSTEOARTHRITIS, RIGHT KNEE: ICD-10-CM

## 2024-02-05 ENCOUNTER — INPATIENT (INPATIENT)
Facility: HOSPITAL | Age: 59
LOS: 0 days | Discharge: HOME HEALTH | DRG: 470 | End: 2024-02-06
Attending: ORTHOPAEDIC SURGERY | Admitting: ORTHOPAEDIC SURGERY
Payer: MEDICARE

## 2024-02-05 ENCOUNTER — RESULT REVIEW (OUTPATIENT)
Age: 59
End: 2024-02-05

## 2024-02-05 ENCOUNTER — APPOINTMENT (OUTPATIENT)
Dept: ORTHOPEDIC SURGERY | Facility: HOSPITAL | Age: 59
End: 2024-02-05

## 2024-02-05 VITALS
DIASTOLIC BLOOD PRESSURE: 88 MMHG | SYSTOLIC BLOOD PRESSURE: 150 MMHG | HEIGHT: 62 IN | RESPIRATION RATE: 18 BRPM | OXYGEN SATURATION: 97 % | HEART RATE: 85 BPM | TEMPERATURE: 96 F | WEIGHT: 218.26 LBS

## 2024-02-05 DIAGNOSIS — Z90.49 ACQUIRED ABSENCE OF OTHER SPECIFIED PARTS OF DIGESTIVE TRACT: Chronic | ICD-10-CM

## 2024-02-05 DIAGNOSIS — Z98.890 OTHER SPECIFIED POSTPROCEDURAL STATES: Chronic | ICD-10-CM

## 2024-02-05 DIAGNOSIS — M17.11 UNILATERAL PRIMARY OSTEOARTHRITIS, RIGHT KNEE: ICD-10-CM

## 2024-02-05 LAB — GLUCOSE BLDC GLUCOMTR-MCNC: 102 MG/DL — HIGH (ref 70–99)

## 2024-02-05 PROCEDURE — 85027 COMPLETE CBC AUTOMATED: CPT

## 2024-02-05 PROCEDURE — 97116 GAIT TRAINING THERAPY: CPT | Mod: GP

## 2024-02-05 PROCEDURE — 36415 COLL VENOUS BLD VENIPUNCTURE: CPT

## 2024-02-05 PROCEDURE — 80048 BASIC METABOLIC PNL TOTAL CA: CPT

## 2024-02-05 PROCEDURE — 88305 TISSUE EXAM BY PATHOLOGIST: CPT | Mod: 26

## 2024-02-05 PROCEDURE — 27447 TOTAL KNEE ARTHROPLASTY: CPT | Mod: RT

## 2024-02-05 PROCEDURE — 97110 THERAPEUTIC EXERCISES: CPT | Mod: GP

## 2024-02-05 PROCEDURE — 97165 OT EVAL LOW COMPLEX 30 MIN: CPT | Mod: GO

## 2024-02-05 PROCEDURE — 73560 X-RAY EXAM OF KNEE 1 OR 2: CPT | Mod: 26,RT

## 2024-02-05 PROCEDURE — 97162 PT EVAL MOD COMPLEX 30 MIN: CPT | Mod: GP

## 2024-02-05 PROCEDURE — 88311 DECALCIFY TISSUE: CPT | Mod: 26

## 2024-02-05 PROCEDURE — 73560 X-RAY EXAM OF KNEE 1 OR 2: CPT | Mod: RT

## 2024-02-05 RX ORDER — VANCOMYCIN HCL 1 G
1500 VIAL (EA) INTRAVENOUS ONCE
Refills: 0 | Status: COMPLETED | OUTPATIENT
Start: 2024-02-05 | End: 2024-02-05

## 2024-02-05 RX ORDER — OXYCODONE HYDROCHLORIDE 5 MG/1
30 TABLET ORAL EVERY 12 HOURS
Refills: 0 | Status: DISCONTINUED | OUTPATIENT
Start: 2024-02-05 | End: 2024-02-06

## 2024-02-05 RX ORDER — IBUPROFEN 200 MG
400 TABLET ORAL THREE TIMES A DAY
Refills: 0 | Status: DISCONTINUED | OUTPATIENT
Start: 2024-02-06 | End: 2024-02-06

## 2024-02-05 RX ORDER — ACETAMINOPHEN 500 MG
1000 TABLET ORAL ONCE
Refills: 0 | Status: COMPLETED | OUTPATIENT
Start: 2024-02-05 | End: 2024-02-05

## 2024-02-05 RX ORDER — SENNA PLUS 8.6 MG/1
2 TABLET ORAL AT BEDTIME
Refills: 0 | Status: DISCONTINUED | OUTPATIENT
Start: 2024-02-05 | End: 2024-02-06

## 2024-02-05 RX ORDER — ONDANSETRON 8 MG/1
4 TABLET, FILM COATED ORAL EVERY 6 HOURS
Refills: 0 | Status: DISCONTINUED | OUTPATIENT
Start: 2024-02-05 | End: 2024-02-06

## 2024-02-05 RX ORDER — CELECOXIB 200 MG/1
200 CAPSULE ORAL DAILY
Refills: 0 | Status: DISCONTINUED | OUTPATIENT
Start: 2024-02-05 | End: 2024-02-05

## 2024-02-05 RX ORDER — PANTOPRAZOLE SODIUM 20 MG/1
40 TABLET, DELAYED RELEASE ORAL
Refills: 0 | Status: DISCONTINUED | OUTPATIENT
Start: 2024-02-05 | End: 2024-02-06

## 2024-02-05 RX ORDER — SODIUM CHLORIDE 9 MG/ML
1000 INJECTION, SOLUTION INTRAVENOUS
Refills: 0 | Status: DISCONTINUED | OUTPATIENT
Start: 2024-02-05 | End: 2024-02-05

## 2024-02-05 RX ORDER — ASPIRIN/CALCIUM CARB/MAGNESIUM 324 MG
81 TABLET ORAL
Refills: 0 | Status: DISCONTINUED | OUTPATIENT
Start: 2024-02-05 | End: 2024-02-06

## 2024-02-05 RX ORDER — OXYCODONE HYDROCHLORIDE 5 MG/1
30 TABLET ORAL EVERY 4 HOURS
Refills: 0 | Status: DISCONTINUED | OUTPATIENT
Start: 2024-02-05 | End: 2024-02-06

## 2024-02-05 RX ORDER — POLYETHYLENE GLYCOL 3350 17 G/17G
17 POWDER, FOR SOLUTION ORAL AT BEDTIME
Refills: 0 | Status: DISCONTINUED | OUTPATIENT
Start: 2024-02-05 | End: 2024-02-06

## 2024-02-05 RX ORDER — CEFAZOLIN SODIUM 1 G
2000 VIAL (EA) INJECTION EVERY 8 HOURS
Refills: 0 | Status: COMPLETED | OUTPATIENT
Start: 2024-02-05 | End: 2024-02-06

## 2024-02-05 RX ORDER — ONDANSETRON 8 MG/1
4 TABLET, FILM COATED ORAL ONCE
Refills: 0 | Status: DISCONTINUED | OUTPATIENT
Start: 2024-02-05 | End: 2024-02-05

## 2024-02-05 RX ORDER — SODIUM CHLORIDE 9 MG/ML
1000 INJECTION INTRAMUSCULAR; INTRAVENOUS; SUBCUTANEOUS
Refills: 0 | Status: DISCONTINUED | OUTPATIENT
Start: 2024-02-05 | End: 2024-02-06

## 2024-02-05 RX ORDER — KETOROLAC TROMETHAMINE 30 MG/ML
15 SYRINGE (ML) INJECTION EVERY 6 HOURS
Refills: 0 | Status: DISCONTINUED | OUTPATIENT
Start: 2024-02-05 | End: 2024-02-06

## 2024-02-05 RX ORDER — MAGNESIUM HYDROXIDE 400 MG/1
30 TABLET, CHEWABLE ORAL DAILY
Refills: 0 | Status: DISCONTINUED | OUTPATIENT
Start: 2024-02-05 | End: 2024-02-06

## 2024-02-05 RX ORDER — HYDROMORPHONE HYDROCHLORIDE 2 MG/ML
0.5 INJECTION INTRAMUSCULAR; INTRAVENOUS; SUBCUTANEOUS
Refills: 0 | Status: DISCONTINUED | OUTPATIENT
Start: 2024-02-05 | End: 2024-02-05

## 2024-02-05 RX ORDER — ALPRAZOLAM 0.25 MG
2 TABLET ORAL THREE TIMES A DAY
Refills: 0 | Status: DISCONTINUED | OUTPATIENT
Start: 2024-02-05 | End: 2024-02-06

## 2024-02-05 RX ORDER — ACETAMINOPHEN 500 MG
650 TABLET ORAL EVERY 6 HOURS
Refills: 0 | Status: DISCONTINUED | OUTPATIENT
Start: 2024-02-05 | End: 2024-02-06

## 2024-02-05 RX ORDER — CHLORHEXIDINE GLUCONATE 213 G/1000ML
1 SOLUTION TOPICAL
Refills: 0 | Status: DISCONTINUED | OUTPATIENT
Start: 2024-02-05 | End: 2024-02-06

## 2024-02-05 RX ADMIN — CELECOXIB 200 MILLIGRAM(S): 200 CAPSULE ORAL at 08:28

## 2024-02-05 RX ADMIN — OXYCODONE HYDROCHLORIDE 30 MILLIGRAM(S): 5 TABLET ORAL at 14:23

## 2024-02-05 RX ADMIN — HYDROMORPHONE HYDROCHLORIDE 0.5 MILLIGRAM(S): 2 INJECTION INTRAMUSCULAR; INTRAVENOUS; SUBCUTANEOUS at 12:54

## 2024-02-05 RX ADMIN — CELECOXIB 200 MILLIGRAM(S): 200 CAPSULE ORAL at 07:58

## 2024-02-05 RX ADMIN — Medication 650 MILLIGRAM(S): at 18:31

## 2024-02-05 RX ADMIN — Medication 1000 MILLIGRAM(S): at 07:58

## 2024-02-05 RX ADMIN — Medication 200 MILLIGRAM(S): at 21:12

## 2024-02-05 RX ADMIN — POLYETHYLENE GLYCOL 3350 17 GRAM(S): 17 POWDER, FOR SOLUTION ORAL at 21:12

## 2024-02-05 RX ADMIN — Medication 15 MILLIGRAM(S): at 21:13

## 2024-02-05 RX ADMIN — Medication 15 MILLIGRAM(S): at 18:25

## 2024-02-05 RX ADMIN — Medication 1000 MILLIGRAM(S): at 08:28

## 2024-02-05 RX ADMIN — Medication 200 MILLIGRAM(S): at 16:36

## 2024-02-05 RX ADMIN — Medication 2 MILLIGRAM(S): at 16:36

## 2024-02-05 RX ADMIN — Medication 2 MILLIGRAM(S): at 22:20

## 2024-02-05 RX ADMIN — SODIUM CHLORIDE 75 MILLILITER(S): 9 INJECTION, SOLUTION INTRAVENOUS at 12:54

## 2024-02-05 RX ADMIN — OXYCODONE HYDROCHLORIDE 30 MILLIGRAM(S): 5 TABLET ORAL at 21:13

## 2024-02-05 RX ADMIN — Medication 81 MILLIGRAM(S): at 18:31

## 2024-02-05 RX ADMIN — Medication 15 MILLIGRAM(S): at 23:49

## 2024-02-05 RX ADMIN — OXYCODONE HYDROCHLORIDE 30 MILLIGRAM(S): 5 TABLET ORAL at 17:03

## 2024-02-05 RX ADMIN — Medication 100 MILLIGRAM(S): at 18:31

## 2024-02-05 RX ADMIN — SENNA PLUS 2 TABLET(S): 8.6 TABLET ORAL at 21:12

## 2024-02-05 RX ADMIN — OXYCODONE HYDROCHLORIDE 30 MILLIGRAM(S): 5 TABLET ORAL at 23:49

## 2024-02-05 RX ADMIN — OXYCODONE HYDROCHLORIDE 30 MILLIGRAM(S): 5 TABLET ORAL at 18:25

## 2024-02-05 RX ADMIN — Medication 650 MILLIGRAM(S): at 19:55

## 2024-02-05 RX ADMIN — Medication 300 MILLIGRAM(S): at 20:28

## 2024-02-05 RX ADMIN — Medication 15 MILLIGRAM(S): at 16:35

## 2024-02-05 NOTE — PATIENT PROFILE ADULT - FALL HARM RISK - HARM RISK INTERVENTIONS
Assistance with ambulation/Assistance OOB with selected safe patient handling equipment/Communicate Risk of Fall with Harm to all staff/Discuss with provider need for PT consult/Monitor gait and stability/Provide patient with walking aids - walker, cane, crutches/Reinforce activity limits and safety measures with patient and family/Sit up slowly, dangle for a short time, stand at bedside before walking/Tailored Fall Risk Interventions/Use of alarms - bed, chair and/or voice tab/Visual Cue: Yellow wristband and red socks/Bed in lowest position, wheels locked, appropriate side rails in place/Call bell, personal items and telephone in reach/Instruct patient to call for assistance before getting out of bed or chair/Non-slip footwear when patient is out of bed/[place] to call system/Physically safe environment - no spills, clutter or unnecessary equipment/Purposeful Proactive Rounding/Room/bathroom lighting operational, light cord in reach

## 2024-02-05 NOTE — PHYSICAL THERAPY INITIAL EVALUATION ADULT - GENERAL OBSERVATIONS, REHAB EVAL
16:05-16:35. Chart reviewed; confirmed with RN to see the pt for PT. Pt ready for PT; received in bed with complain of 6/10 pain in R knee. +hep lock, +ace bandage R knee. Agreeable for PT evaluation.

## 2024-02-05 NOTE — PHYSICAL THERAPY INITIAL EVALUATION ADULT - PREDICTED DURATION OF THERAPY (DAYS/WKS), PT EVAL
I have personally seen and examined the patient. I have collaborated with and supervised the During hospital stay

## 2024-02-05 NOTE — PHYSICAL THERAPY INITIAL EVALUATION ADULT - PERTINENT HX OF CURRENT PROBLEM, REHAB EVAL
Pt is a 57 y/o female with dx of elective R TKR with h/o R knee OA under regional anesthesia seen pod #0.

## 2024-02-05 NOTE — PHYSICAL THERAPY INITIAL EVALUATION ADULT - LIVES WITH, PROFILE
Pt lives alone in a private home with 2 steps to enter + 1 step with platform in the middle and one handrail and then 2 steps inside to the kitchen with two handrails./alone

## 2024-02-05 NOTE — ASU PREOP CHECKLIST - VIA
Spoke to patient to schedule procedure(s) Colonoscopy       Physician to perform procedure(s) Dr. KANNAN Westfall  Date of Procedure (s) 4/15/24  Arrival Time 2:30 PM  Time of Procedure(s) 3:30 PM   Location of Procedure(s) [place] [address] Floor  Type of Rx Prep sent to patient: Suprep  Instructions provided to patient via [facility]    Patient was informed on the following information and verbalized understanding. Screening questionnaire reviewed with patient and complete. If procedure requires anesthesia, a responsible adult needs to be present to accompany the patient home, patient cannot drive after receiving anesthesia. Appointment details are tentative, especially check-in time. Patient will receive a prep-op call 7 days prior to confirm check-in time for procedure. If applicable the patient should contact their pharmacy to verify Rx for procedure prep is ready for pick-up. Patient was advised to call the scheduling department at [phone] if pharmacy states no Rx is available. Patient was advised to call the endoscopy scheduling department if any questions or concerns arise.      [facility] Scheduling Department    
Demetri

## 2024-02-05 NOTE — ASU PREOP CHECKLIST - 1.
patient with chronic pain needs and uses walker to walk, MRSA + was treated for 5 days with mupirocin

## 2024-02-05 NOTE — CHART NOTE - NSCHARTNOTEFT_GEN_A_CORE
PACU ANESTHESIA ADMISSION NOTE      Procedure: Right total knee arthroplasty      Post op diagnosis:  Osteoarthritis of right knee        ____  Intubated  TV:______       Rate: ______      FiO2: ______    __x__  Patent Airway    __x__  Full return of protective reflexes    __x__  Full recovery from anesthesia / back to baseline     Vitals:   T:  92.5 axillary ( bairhugger in progress)         R:      24            BP:     135/70             Sat:      96             P: 78      Mental Status:  __x__ Awake   _____ Alert   _____ Drowsy   _____ Sedated    Nausea/Vomiting:  __x__ NO  ______Yes,   See Post - Op Orders          Pain Scale (0-10):  ____0_    Treatment: ____ None    ____ See Post - Op/PCA Orders    Post - Operative Fluids:   ____ Oral   __x_ See Post - Op Orders    Plan: Discharge:   ____Home       __x___Floor     _____Critical Care    _____  Other:_________________    Comments:

## 2024-02-06 ENCOUNTER — TRANSCRIPTION ENCOUNTER (OUTPATIENT)
Age: 59
End: 2024-02-06

## 2024-02-06 ENCOUNTER — RX RENEWAL (OUTPATIENT)
Age: 59
End: 2024-02-06

## 2024-02-06 VITALS
HEART RATE: 83 BPM | WEIGHT: 238.32 LBS | TEMPERATURE: 97 F | OXYGEN SATURATION: 94 % | SYSTOLIC BLOOD PRESSURE: 131 MMHG | DIASTOLIC BLOOD PRESSURE: 80 MMHG | RESPIRATION RATE: 18 BRPM

## 2024-02-06 LAB
ANION GAP SERPL CALC-SCNC: 12 MMOL/L — SIGNIFICANT CHANGE UP (ref 7–14)
BUN SERPL-MCNC: 13 MG/DL — SIGNIFICANT CHANGE UP (ref 10–20)
CALCIUM SERPL-MCNC: 9.4 MG/DL — SIGNIFICANT CHANGE UP (ref 8.4–10.5)
CHLORIDE SERPL-SCNC: 102 MMOL/L — SIGNIFICANT CHANGE UP (ref 98–110)
CO2 SERPL-SCNC: 26 MMOL/L — SIGNIFICANT CHANGE UP (ref 17–32)
CREAT SERPL-MCNC: 0.9 MG/DL — SIGNIFICANT CHANGE UP (ref 0.7–1.5)
EGFR: 74 ML/MIN/1.73M2 — SIGNIFICANT CHANGE UP
GLUCOSE SERPL-MCNC: 119 MG/DL — HIGH (ref 70–99)
HCT VFR BLD CALC: 34 % — LOW (ref 37–47)
HGB BLD-MCNC: 11.4 G/DL — LOW (ref 12–16)
MCHC RBC-ENTMCNC: 28.9 PG — SIGNIFICANT CHANGE UP (ref 27–31)
MCHC RBC-ENTMCNC: 33.5 G/DL — SIGNIFICANT CHANGE UP (ref 32–37)
MCV RBC AUTO: 86.1 FL — SIGNIFICANT CHANGE UP (ref 81–99)
NRBC # BLD: 0 /100 WBCS — SIGNIFICANT CHANGE UP (ref 0–0)
PLATELET # BLD AUTO: 180 K/UL — SIGNIFICANT CHANGE UP (ref 130–400)
PMV BLD: 14.2 FL — HIGH (ref 7.4–10.4)
POTASSIUM SERPL-MCNC: 4.3 MMOL/L — SIGNIFICANT CHANGE UP (ref 3.5–5)
POTASSIUM SERPL-SCNC: 4.3 MMOL/L — SIGNIFICANT CHANGE UP (ref 3.5–5)
RBC # BLD: 3.95 M/UL — LOW (ref 4.2–5.4)
RBC # FLD: 12 % — SIGNIFICANT CHANGE UP (ref 11.5–14.5)
SODIUM SERPL-SCNC: 140 MMOL/L — SIGNIFICANT CHANGE UP (ref 135–146)
WBC # BLD: 13.63 K/UL — HIGH (ref 4.8–10.8)
WBC # FLD AUTO: 13.63 K/UL — HIGH (ref 4.8–10.8)

## 2024-02-06 PROCEDURE — 99232 SBSQ HOSP IP/OBS MODERATE 35: CPT

## 2024-02-06 RX ORDER — ACETAMINOPHEN 500 MG
2 TABLET ORAL
Qty: 0 | Refills: 0 | DISCHARGE
Start: 2024-02-06

## 2024-02-06 RX ORDER — POLYETHYLENE GLYCOL 3350 17 G/17G
17 POWDER, FOR SOLUTION ORAL
Qty: 0 | Refills: 0 | DISCHARGE
Start: 2024-02-06

## 2024-02-06 RX ORDER — PANTOPRAZOLE SODIUM 20 MG/1
1 TABLET, DELAYED RELEASE ORAL
Qty: 30 | Refills: 0
Start: 2024-02-06 | End: 2024-03-06

## 2024-02-06 RX ORDER — IBUPROFEN 200 MG
1 TABLET ORAL
Qty: 42 | Refills: 0
Start: 2024-02-06 | End: 2024-02-19

## 2024-02-06 RX ORDER — SENNA PLUS 8.6 MG/1
2 TABLET ORAL
Qty: 0 | Refills: 0 | DISCHARGE
Start: 2024-02-06

## 2024-02-06 RX ORDER — ASPIRIN/CALCIUM CARB/MAGNESIUM 324 MG
1 TABLET ORAL
Qty: 60 | Refills: 0
Start: 2024-02-06 | End: 2024-03-06

## 2024-02-06 RX ORDER — NAPROXEN 500 MG/1
500 TABLET ORAL
Qty: 60 | Refills: 0 | Status: ACTIVE | COMMUNITY
Start: 2023-05-22 | End: 1900-01-01

## 2024-02-06 RX ADMIN — Medication 15 MILLIGRAM(S): at 08:14

## 2024-02-06 RX ADMIN — Medication 200 MILLIGRAM(S): at 05:43

## 2024-02-06 RX ADMIN — Medication 650 MILLIGRAM(S): at 05:43

## 2024-02-06 RX ADMIN — OXYCODONE HYDROCHLORIDE 30 MILLIGRAM(S): 5 TABLET ORAL at 06:39

## 2024-02-06 RX ADMIN — Medication 15 MILLIGRAM(S): at 04:17

## 2024-02-06 RX ADMIN — PANTOPRAZOLE SODIUM 40 MILLIGRAM(S): 20 TABLET, DELAYED RELEASE ORAL at 05:44

## 2024-02-06 RX ADMIN — CHLORHEXIDINE GLUCONATE 1 APPLICATION(S): 213 SOLUTION TOPICAL at 05:43

## 2024-02-06 RX ADMIN — Medication 100 MILLIGRAM(S): at 03:09

## 2024-02-06 RX ADMIN — Medication 650 MILLIGRAM(S): at 00:06

## 2024-02-06 RX ADMIN — Medication 650 MILLIGRAM(S): at 00:34

## 2024-02-06 RX ADMIN — OXYCODONE HYDROCHLORIDE 30 MILLIGRAM(S): 5 TABLET ORAL at 08:14

## 2024-02-06 RX ADMIN — Medication 15 MILLIGRAM(S): at 03:30

## 2024-02-06 RX ADMIN — Medication 81 MILLIGRAM(S): at 05:43

## 2024-02-06 RX ADMIN — Medication 15 MILLIGRAM(S): at 11:14

## 2024-02-06 RX ADMIN — OXYCODONE HYDROCHLORIDE 30 MILLIGRAM(S): 5 TABLET ORAL at 03:30

## 2024-02-06 RX ADMIN — OXYCODONE HYDROCHLORIDE 30 MILLIGRAM(S): 5 TABLET ORAL at 13:37

## 2024-02-06 RX ADMIN — OXYCODONE HYDROCHLORIDE 30 MILLIGRAM(S): 5 TABLET ORAL at 12:17

## 2024-02-06 RX ADMIN — OXYCODONE HYDROCHLORIDE 30 MILLIGRAM(S): 5 TABLET ORAL at 05:43

## 2024-02-06 RX ADMIN — OXYCODONE HYDROCHLORIDE 30 MILLIGRAM(S): 5 TABLET ORAL at 11:14

## 2024-02-06 RX ADMIN — Medication 650 MILLIGRAM(S): at 06:39

## 2024-02-06 RX ADMIN — Medication 650 MILLIGRAM(S): at 13:37

## 2024-02-06 RX ADMIN — Medication 650 MILLIGRAM(S): at 11:18

## 2024-02-06 NOTE — DISCHARGE NOTE NURSING/CASE MANAGEMENT/SOCIAL WORK - PATIENT PORTAL LINK FT
You can access the FollowMyHealth Patient Portal offered by [facility] by registering at the following website: [email or web address]. By joining [employer]’s FollowMyHealth portal, you will also be able to view your health information using other applications (apps) compatible with our system.

## 2024-02-06 NOTE — CONSULT NOTE ADULT - SUBJECTIVE AND OBJECTIVE BOX
GERRYYVON CASEY  58y, Female  Allergy: No Known Allergies      CHIEF COMPLAINT: Total Knee Arthroplasty  (06 Feb 2024 08:35)      HPI:    HPI:    FAMILY HISTORY:  Known health problems: none (Father, Mother)      PAST MEDICAL & SURGICAL HISTORY:  Right knee pain      S/P laparoscopic cholecystectomy      Previous back surgery  L5 S1-S2          SOCIAL HISTORY  Social History:      Home Medications:  acetaminophen 325 mg oral tablet: 2 tab(s) orally every 6 hours (06 Feb 2024 08:34)  ALPRAZolam 2 mg oral tablet: 1 tab(s) orally 3 times a day (05 Feb 2024 07:28)  oxyCODONE 30 mg oral tablet: 1 tab(s) orally every 4 hours as needed for  moderate pain (05 Feb 2024 07:28)  oxyMORphone 40 mg oral tablet, extended release: 1 tab(s) orally every 8 hours (05 Feb 2024 07:28)  polyethylene glycol 3350 oral powder for reconstitution: 17 gram(s) orally once a day (at bedtime) as needed for  constipation (06 Feb 2024 08:34)  pregabalin 200 mg oral capsule: 1 cap(s) orally 3 times a day (05 Feb 2024 07:28)  senna leaf extract oral tablet: 2 tab(s) orally once a day (at bedtime) (06 Feb 2024 08:34)      ROS  General: Denies fevers, chills, nightsweats, weight loss  HEENT: Denies headache, rhinorrhea, sore throat, eye pain  CV: Denies CP, palpitations  PULM: Denies SOB, cough  GI: Denies abdominal pain, diarrhea  : Denies dysuria, hematuria  MSK: Denies arthralgias  SKIN: Denies rash   NEURO: Denies paresthesias, weakness  PSYCH: Denies depression    VITALS:  T(F): 97.2, Max: 97.2 (02-06-24 @ 04:46)  HR: 83  BP: 131/80  RR: 18Vital Signs Last 24 Hrs  T(C): 36.2 (06 Feb 2024 04:46), Max: 36.2 (06 Feb 2024 04:46)  T(F): 97.2 (06 Feb 2024 04:46), Max: 97.2 (06 Feb 2024 04:46)  HR: 83 (06 Feb 2024 04:46) (60 - 84)  BP: 131/80 (06 Feb 2024 04:46) (129/73 - 193/77)  BP(mean): 93 (05 Feb 2024 12:13) (93 - 93)  RR: 18 (06 Feb 2024 04:46) (12 - 18)  SpO2: 94% (06 Feb 2024 04:46) (91% - 98%)    Parameters below as of 06 Feb 2024 04:46  Patient On (Oxygen Delivery Method): room air        PHYSICAL EXAM:  Gen: NAD, resting in bed  HEENT: Normocephalic, atraumatic  Neck: supple, no lymphadenopathy  CV: Regular rate & regular rhythm  Lungs: CTABL no wheeze  Abdomen: Soft, NTND+ BS present  Ext: Warm, well perfused no CCE  Neuro: non focal, awake, CN II-XII intact   Skin: no rash, no erythema  Psych: no SI, HI, Hallucination     TESTS & MEASUREMENTS:                        11.4   13.63 )-----------( 180      ( 06 Feb 2024 07:45 )             34.0     02-06    140  |  102  |  13  ----------------------------<  119<H>  4.3   |  26  |  0.9    Ca    9.4      06 Feb 2024 07:45          Urinalysis Basic - ( 06 Feb 2024 07:45 )    Color: x / Appearance: x / SG: x / pH: x  Gluc: 119 mg/dL / Ketone: x  / Bili: x / Urobili: x   Blood: x / Protein: x / Nitrite: x   Leuk Esterase: x / RBC: x / WBC x   Sq Epi: x / Non Sq Epi: x / Bacteria: x            QRS axis to [] ° and NSR at a rate of [] BPM. There was no atrial enlargement. There was no ventricular hypertrophy. There were no ST-T changes and all intervals were normal.      INFECTIOUS DISEASES TESTING  MRSA PCR Result.: Positive (01-30-24 @ 13:00)      RADIOLOGY & ADDITIONAL TESTS:  I have personally reviewed the last Chest xray  CXR      CT      CARDIOLOGY TESTING  12 Lead ECG:   Ventricular Rate 66 BPM    Atrial Rate 66 BPM    P-R Interval 174 ms    QRS Duration 82 ms    Q-T Interval 374 ms    QTC Calculation(Bazett) 392 ms    P Axis 18 degrees    R Axis 3 degrees    T Axis 16 degrees    Diagnosis Line Normal sinus rhythm  Normal ECG    Confirmed by Radha Cardoza MD ([id number]) on 1/30/2024 5:07:06 PM (01-30-24 @ 09:21)      MEDICATIONS  (STANDING):  acetaminophen     Tablet .. 650 milliGRAM(s) Oral every 6 hours  ALPRAZolam 2 milliGRAM(s) Oral three times a day  aspirin enteric coated 81 milliGRAM(s) Oral two times a day  chlorhexidine 2% Cloths 1 Application(s) Topical <User Schedule>  ibuprofen  Tablet. 400 milliGRAM(s) Oral three times a day  oxyCODONE  ER Tablet 30 milliGRAM(s) Oral every 12 hours  pantoprazole    Tablet 40 milliGRAM(s) Oral before breakfast  polyethylene glycol 3350 17 Gram(s) Oral at bedtime  pregabalin 200 milliGRAM(s) Oral three times a day  senna 2 Tablet(s) Oral at bedtime    MEDICATIONS  (PRN):  magnesium hydroxide Suspension 30 milliLiter(s) Oral daily PRN Constipation  ondansetron Injectable 4 milliGRAM(s) IV Push every 6 hours PRN Nausea and/or Vomiting  oxyCODONE    IR 30 milliGRAM(s) Oral every 4 hours PRN Severe Pain (7 - 10)      ANTIBIOTICS:      All available historical data has been reviewed    ASSESSMENT  58y F admitted with Primary osteoarthritis of right knee        PROBLEMS       GERRYYVON CASEY  58y, Female  Allergy: No Known Allergies      CHIEF COMPLAINT: Total Knee Arthroplasty  (06 Feb 2024 08:35)      HPI:    HPI:    FAMILY HISTORY:  Known health problems: none (Father, Mother)      PAST MEDICAL & SURGICAL HISTORY:  Right knee pain      S/P laparoscopic cholecystectomy      Previous back surgery  L5 S1-S2          SOCIAL HISTORY  Social History:      Home Medications:  acetaminophen 325 mg oral tablet: 2 tab(s) orally every 6 hours (06 Feb 2024 08:34)  ALPRAZolam 2 mg oral tablet: 1 tab(s) orally 3 times a day (05 Feb 2024 07:28)  oxyCODONE 30 mg oral tablet: 1 tab(s) orally every 4 hours as needed for  moderate pain (05 Feb 2024 07:28)  oxyMORphone 40 mg oral tablet, extended release: 1 tab(s) orally every 8 hours (05 Feb 2024 07:28)  polyethylene glycol 3350 oral powder for reconstitution: 17 gram(s) orally once a day (at bedtime) as needed for  constipation (06 Feb 2024 08:34)  pregabalin 200 mg oral capsule: 1 cap(s) orally 3 times a day (05 Feb 2024 07:28)  senna leaf extract oral tablet: 2 tab(s) orally once a day (at bedtime) (06 Feb 2024 08:34)      ROS  General: Denies fevers, chills, nightsweats, weight loss  HEENT: Denies headache, rhinorrhea, sore throat, eye pain  CV: Denies CP, palpitations  PULM: Denies SOB, cough  GI: Denies abdominal pain, diarrhea  : Denies dysuria, hematuria  MSK: Denies arthralgias  SKIN: Denies rash   NEURO: Denies paresthesias, weakness  PSYCH: Denies depression    VITALS:  T(F): 97.2, Max: 97.2 (02-06-24 @ 04:46)  HR: 83  BP: 131/80  RR: 18Vital Signs Last 24 Hrs  T(C): 36.2 (06 Feb 2024 04:46), Max: 36.2 (06 Feb 2024 04:46)  T(F): 97.2 (06 Feb 2024 04:46), Max: 97.2 (06 Feb 2024 04:46)  HR: 83 (06 Feb 2024 04:46) (60 - 84)  BP: 131/80 (06 Feb 2024 04:46) (129/73 - 193/77)  BP(mean): 93 (05 Feb 2024 12:13) (93 - 93)  RR: 18 (06 Feb 2024 04:46) (12 - 18)  SpO2: 94% (06 Feb 2024 04:46) (91% - 98%)    Parameters below as of 06 Feb 2024 04:46  Patient On (Oxygen Delivery Method): room air        PHYSICAL EXAM:  Gen: NAD, resting in bed  HEENT: Normocephalic, atraumatic  Neck: supple, no lymphadenopathy  CV: Regular rate & regular rhythm  Lungs: CTABL no wheeze  Abdomen: Soft, NTND+ BS present  Ext: Warm, well perfused no CCE dressing intact   Neuro: non focal, awake, CN II-XII intact   Skin: no rash, no erythema  Psych: no SI, HI, Hallucination     TESTS & MEASUREMENTS:                        11.4   13.63 )-----------( 180      ( 06 Feb 2024 07:45 )             34.0     02-06    140  |  102  |  13  ----------------------------<  119<H>  4.3   |  26  |  0.9    Ca    9.4      06 Feb 2024 07:45          Urinalysis Basic - ( 06 Feb 2024 07:45 )    Color: x / Appearance: x / SG: x / pH: x  Gluc: 119 mg/dL / Ketone: x  / Bili: x / Urobili: x   Blood: x / Protein: x / Nitrite: x   Leuk Esterase: x / RBC: x / WBC x   Sq Epi: x / Non Sq Epi: x / Bacteria: x            QRS axis to [] ° and NSR at a rate of [] BPM. There was no atrial enlargement. There was no ventricular hypertrophy. There were no ST-T changes and all intervals were normal.      INFECTIOUS DISEASES TESTING  MRSA PCR Result.: Positive (01-30-24 @ 13:00)      RADIOLOGY & ADDITIONAL TESTS:  I have personally reviewed the last Chest xray  CXR      CT      CARDIOLOGY TESTING  12 Lead ECG:   Ventricular Rate 66 BPM    Atrial Rate 66 BPM    P-R Interval 174 ms    QRS Duration 82 ms    Q-T Interval 374 ms    QTC Calculation(Bazett) 392 ms    P Axis 18 degrees    R Axis 3 degrees    T Axis 16 degrees    Diagnosis Line Normal sinus rhythm  Normal ECG    Confirmed by Radha Cardoza MD ([id number]) on 1/30/2024 5:07:06 PM (01-30-24 @ 09:21)      MEDICATIONS  (STANDING):  acetaminophen     Tablet .. 650 milliGRAM(s) Oral every 6 hours  ALPRAZolam 2 milliGRAM(s) Oral three times a day  aspirin enteric coated 81 milliGRAM(s) Oral two times a day  chlorhexidine 2% Cloths 1 Application(s) Topical <User Schedule>  ibuprofen  Tablet. 400 milliGRAM(s) Oral three times a day  oxyCODONE  ER Tablet 30 milliGRAM(s) Oral every 12 hours  pantoprazole    Tablet 40 milliGRAM(s) Oral before breakfast  polyethylene glycol 3350 17 Gram(s) Oral at bedtime  pregabalin 200 milliGRAM(s) Oral three times a day  senna 2 Tablet(s) Oral at bedtime    MEDICATIONS  (PRN):  magnesium hydroxide Suspension 30 milliLiter(s) Oral daily PRN Constipation  ondansetron Injectable 4 milliGRAM(s) IV Push every 6 hours PRN Nausea and/or Vomiting  oxyCODONE    IR 30 milliGRAM(s) Oral every 4 hours PRN Severe Pain (7 - 10)      ANTIBIOTICS:      All available historical data has been reviewed    ASSESSMENT  58y F admitted with Primary osteoarthritis of right knee        PROBLEMS

## 2024-02-06 NOTE — DISCHARGE NOTE PROVIDER - NSDCCPCAREPLAN_GEN_ALL_CORE_FT
PRINCIPAL DISCHARGE DIAGNOSIS  Diagnosis: Arthritis of right knee  Assessment and Plan of Treatment: Keep surgical site clean and dry, may remove dressing in 6  days . Call your surgeon if any wound drainage, redness , increasing pain, fevers over 101 or if you have any questions or concerns.  Ice pack to affected area q4-6h as needed   You may shower with the bandage on and once it is removed. Once it is removed  , do not scrub surgical site. Do not apply any lotions/moisturizers/creams to surgical site.  Call to make your  post op appointment if you do not have one already.

## 2024-02-06 NOTE — OCCUPATIONAL THERAPY INITIAL EVALUATION ADULT - LIVES WITH, PROFILE
in a private home +2 steps to enter +1 step with one handrail +2 steps with bilateral handrails to kitchen +walk-in-shower +shower chair +standard toilet/alone

## 2024-02-06 NOTE — DISCHARGE NOTE PROVIDER - CARE PROVIDER_API CALL
Familia Vogel  Orthopaedic Surgery  [address] [place] [address]  Phone: [phone]  Fax: [phone]  Follow Up Time:

## 2024-02-06 NOTE — OCCUPATIONAL THERAPY INITIAL EVALUATION ADULT - TRANSFER SAFETY CONCERNS NOTED: SHOWER, REHAB EVAL
As discussed with pt, pt practice shower transfer with home therapist prior to performing independently to increase safety. Pt demonstrated good understanding and in agreement./decreased weight-shifting ability

## 2024-02-06 NOTE — PROGRESS NOTE ADULT - SUBJECTIVE AND OBJECTIVE BOX
58y Female POD #  1    S/P right Total Knee Arthroplasty     Patient seen and examined at bedside . The patient is awake and alert in NAD. No complaints of chest pain, SOB, N/V.  c/o pain , the patient has ambulated and is voiding.     PAST MEDICAL & SURGICAL HISTORY:  Right knee pain    S/P laparoscopic cholecystectomy    Previous back surgery  L5 S1-S2          MEDICATIONS  (STANDING):  acetaminophen     Tablet .. 650 milliGRAM(s) Oral every 6 hours  ALPRAZolam 2 milliGRAM(s) Oral three times a day  aspirin enteric coated 81 milliGRAM(s) Oral two times a day  chlorhexidine 2% Cloths 1 Application(s) Topical <User Schedule>  ibuprofen  Tablet. 400 milliGRAM(s) Oral three times a day  oxyCODONE  ER Tablet 30 milliGRAM(s) Oral every 12 hours  pantoprazole    Tablet 40 milliGRAM(s) Oral before breakfast  polyethylene glycol 3350 17 Gram(s) Oral at bedtime  pregabalin 200 milliGRAM(s) Oral three times a day  senna 2 Tablet(s) Oral at bedtime  sodium chloride 0.9%. 1000 milliLiter(s) (75 mL/Hr) IV Continuous <Continuous>    MEDICATIONS  (PRN):  magnesium hydroxide Suspension 30 milliLiter(s) Oral daily PRN Constipation  ondansetron Injectable 4 milliGRAM(s) IV Push every 6 hours PRN Nausea and/or Vomiting  oxyCODONE    IR 30 milliGRAM(s) Oral every 4 hours PRN Severe Pain (7 - 10)        Vital Signs Last 24 Hrs  T(C): 36.2 (06 Feb 2024 04:46), Max: 36.2 (06 Feb 2024 04:46)  T(F): 97.2 (06 Feb 2024 04:46), Max: 97.2 (06 Feb 2024 04:46)  HR: 83 (06 Feb 2024 04:46) (60 - 84)  BP: 131/80 (06 Feb 2024 04:46) (129/73 - 193/77)  BP(mean): 93 (05 Feb 2024 12:13) (93 - 93)  RR: 18 (06 Feb 2024 04:46) (12 - 18)  SpO2: 94% (06 Feb 2024 04:46) (91% - 98%)                      PE:  The patient was seen and examined at bedside          A&OX3, NAD          right knee Aquacel  dressing C/D/I          Compartments soft, BLE SCD in place          NVI, SILT           A/P:     # POD #  1     s/p right Total Knee Arthroplasty                 - OOB to Chair   -PT/OT - wbat  -post op abx   -Pain control - continue on her home pain meds   -Incentive Spirometry   -DVT Prophylaxis - aspirin   -GI ppx- continue Protonix   -f/u am labs   -discharge planning- home with home care

## 2024-02-06 NOTE — DISCHARGE NOTE PROVIDER - NSDCFUSCHEDAPPT_GEN_ALL_CORE_FT
Familia Vogel  [facility] Physician [facility]  [address]  Scheduled Appointment: 02/22/2024    Cat Naik  [facility]  [address]  Scheduled Appointment: 03/04/2024

## 2024-02-06 NOTE — DISCHARGE NOTE PROVIDER - HOSPITAL COURSE
58 year old female who was admitted for an elective right Total Knee Arthroplasty. The patient tolerated surgery well with no intra/post operative complications. She received intra/post operative antibiotics for infection prophylaxis and will be discharged on Aspirin 81mg BID for 30 days to lower the risk of blood clots. She worked with Physical Therapy while admitted to the hospital and is stable for discharge.

## 2024-02-06 NOTE — DISCHARGE NOTE PROVIDER - NSDCMRMEDTOKEN_GEN_ALL_CORE_FT
acetaminophen 325 mg oral tablet: 2 tab(s) orally every 6 hours  ALPRAZolam 2 mg oral tablet: 1 tab(s) orally 3 times a day  aspirin 81 mg oral delayed release tablet: 1 tab(s) orally 2 times a day lower the risk of dvt  ibuprofen 400 mg oral tablet: 1 tab(s) orally 3 times a day x 2 weeks after surgery for post op pain  oxyCODONE 30 mg oral tablet: 1 tab(s) orally every 4 hours as needed for  moderate pain  oxyMORphone 40 mg oral tablet, extended release: 1 tab(s) orally every 8 hours  pantoprazole 40 mg oral delayed release tablet: 1 tab(s) orally once a day (before a meal) GI prophylaxis  polyethylene glycol 3350 oral powder for reconstitution: 17 gram(s) orally once a day (at bedtime) as needed for  constipation  pregabalin 200 mg oral capsule: 1 cap(s) orally 3 times a day  senna leaf extract oral tablet: 2 tab(s) orally once a day (at bedtime)

## 2024-02-06 NOTE — OCCUPATIONAL THERAPY INITIAL EVALUATION ADULT - GENERAL OBSERVATIONS, REHAB EVAL
11:00-11:35; chart reviewed, ok to treat by Occupational Therapist as confirmed by ASIYA Hong, Pt received semifowler +aquacel right knee +LUE heplock in NAD. Pt reported 6/10 right knee pain; RN aware. Pt in agreement with OT IE.

## 2024-02-07 ENCOUNTER — TRANSCRIPTION ENCOUNTER (OUTPATIENT)
Age: 59
End: 2024-02-07

## 2024-02-08 ENCOUNTER — TRANSCRIPTION ENCOUNTER (OUTPATIENT)
Age: 59
End: 2024-02-08

## 2024-02-12 LAB — SURGICAL PATHOLOGY STUDY: SIGNIFICANT CHANGE UP

## 2024-02-16 ENCOUNTER — TRANSCRIPTION ENCOUNTER (OUTPATIENT)
Age: 59
End: 2024-02-16

## 2024-02-22 ENCOUNTER — APPOINTMENT (OUTPATIENT)
Dept: ORTHOPEDIC SURGERY | Facility: CLINIC | Age: 59
End: 2024-02-22
Payer: MEDICARE

## 2024-02-22 DIAGNOSIS — M17.11 UNILATERAL PRIMARY OSTEOARTHRITIS, RIGHT KNEE: ICD-10-CM

## 2024-02-22 PROBLEM — M25.561 PAIN IN RIGHT KNEE: Chronic | Status: ACTIVE | Noted: 2024-01-30

## 2024-02-22 PROCEDURE — 99024 POSTOP FOLLOW-UP VISIT: CPT

## 2024-02-22 PROCEDURE — 73560 X-RAY EXAM OF KNEE 1 OR 2: CPT | Mod: 50

## 2024-02-22 NOTE — HISTORY OF PRESENT ILLNESS
[de-identified] : s/p right TKA doing well postop course uncomplicated, home PT complete, progressing appropriately, now ready for OP PT.  pain controlled (-)n/v/cp/sob  Right knee exam shows well healed incision NTTP AROM 2-90 negative francis  Imaging:  AP and Lateral views were obtained of the knees, including the contralateral knee for comparison. X-rays are negative for acute bone or soft tissue trauma. Right knee replacement in good alignment without radiographic evidence of complication.  Plan: continue home exercise activities as tolerated OP PT continue dvt prophylaxis f/u at 6 weeks.

## 2024-02-29 ENCOUNTER — TRANSCRIPTION ENCOUNTER (OUTPATIENT)
Age: 59
End: 2024-02-29

## 2024-03-04 ENCOUNTER — APPOINTMENT (OUTPATIENT)
Dept: ENDOCRINOLOGY | Facility: CLINIC | Age: 59
End: 2024-03-04

## 2024-03-14 ENCOUNTER — TRANSCRIPTION ENCOUNTER (OUTPATIENT)
Age: 59
End: 2024-03-14

## 2024-04-16 ENCOUNTER — APPOINTMENT (OUTPATIENT)
Dept: ORTHOPEDIC SURGERY | Facility: CLINIC | Age: 59
End: 2024-04-16
Payer: MEDICARE

## 2024-04-16 DIAGNOSIS — M17.0 BILATERAL PRIMARY OSTEOARTHRITIS OF KNEE: ICD-10-CM

## 2024-04-16 PROCEDURE — 99024 POSTOP FOLLOW-UP VISIT: CPT

## 2024-04-16 NOTE — HISTORY OF PRESENT ILLNESS
[de-identified] : Follow-up of the right knee.  Doing well no issues with the right knee. She is scheduled for the left knee replacement in May. Incision of the right knee appears well-healed, no signs of infection, range of motion 0-110.  plan: tka in may continue home exercises for rt knee

## 2024-05-07 ENCOUNTER — RESULT REVIEW (OUTPATIENT)
Age: 59
End: 2024-05-07

## 2024-05-07 ENCOUNTER — OUTPATIENT (OUTPATIENT)
Dept: OUTPATIENT SERVICES | Facility: HOSPITAL | Age: 59
LOS: 1 days | End: 2024-05-07
Payer: MEDICARE

## 2024-05-07 VITALS
HEART RATE: 76 BPM | DIASTOLIC BLOOD PRESSURE: 80 MMHG | SYSTOLIC BLOOD PRESSURE: 130 MMHG | TEMPERATURE: 97 F | WEIGHT: 218.04 LBS | OXYGEN SATURATION: 98 % | HEIGHT: 62 IN | RESPIRATION RATE: 16 BRPM

## 2024-05-07 DIAGNOSIS — Z01.818 ENCOUNTER FOR OTHER PREPROCEDURAL EXAMINATION: ICD-10-CM

## 2024-05-07 DIAGNOSIS — M17.12 UNILATERAL PRIMARY OSTEOARTHRITIS, LEFT KNEE: ICD-10-CM

## 2024-05-07 DIAGNOSIS — Z90.49 ACQUIRED ABSENCE OF OTHER SPECIFIED PARTS OF DIGESTIVE TRACT: Chronic | ICD-10-CM

## 2024-05-07 DIAGNOSIS — Z98.890 OTHER SPECIFIED POSTPROCEDURAL STATES: Chronic | ICD-10-CM

## 2024-05-07 DIAGNOSIS — Z96.652 PRESENCE OF LEFT ARTIFICIAL KNEE JOINT: Chronic | ICD-10-CM

## 2024-05-07 LAB
A1C WITH ESTIMATED AVERAGE GLUCOSE RESULT: 5.6 % — SIGNIFICANT CHANGE UP (ref 4–5.6)
ALBUMIN SERPL ELPH-MCNC: 4.4 G/DL — SIGNIFICANT CHANGE UP (ref 3.5–5.2)
ALP SERPL-CCNC: 97 U/L — SIGNIFICANT CHANGE UP (ref 30–115)
ALT FLD-CCNC: 10 U/L — SIGNIFICANT CHANGE UP (ref 0–41)
ANION GAP SERPL CALC-SCNC: 14 MMOL/L — SIGNIFICANT CHANGE UP (ref 7–14)
APTT BLD: 30.5 SEC — SIGNIFICANT CHANGE UP (ref 27–39.2)
AST SERPL-CCNC: 17 U/L — SIGNIFICANT CHANGE UP (ref 0–41)
BASOPHILS # BLD AUTO: 0.04 K/UL — SIGNIFICANT CHANGE UP (ref 0–0.2)
BASOPHILS NFR BLD AUTO: 1.2 % — HIGH (ref 0–1)
BILIRUB SERPL-MCNC: 0.4 MG/DL — SIGNIFICANT CHANGE UP (ref 0.2–1.2)
BLD GP AB SCN SERPL QL: SIGNIFICANT CHANGE UP
BUN SERPL-MCNC: 11 MG/DL — SIGNIFICANT CHANGE UP (ref 10–20)
CALCIUM SERPL-MCNC: 9.4 MG/DL — SIGNIFICANT CHANGE UP (ref 8.4–10.5)
CHLORIDE SERPL-SCNC: 102 MMOL/L — SIGNIFICANT CHANGE UP (ref 98–110)
CO2 SERPL-SCNC: 27 MMOL/L — SIGNIFICANT CHANGE UP (ref 17–32)
CREAT SERPL-MCNC: 0.9 MG/DL — SIGNIFICANT CHANGE UP (ref 0.7–1.5)
EGFR: 74 ML/MIN/1.73M2 — SIGNIFICANT CHANGE UP
EOSINOPHIL # BLD AUTO: 0.13 K/UL — SIGNIFICANT CHANGE UP (ref 0–0.7)
EOSINOPHIL NFR BLD AUTO: 3.9 % — SIGNIFICANT CHANGE UP (ref 0–8)
ESTIMATED AVERAGE GLUCOSE: 114 MG/DL — SIGNIFICANT CHANGE UP (ref 68–114)
GLUCOSE SERPL-MCNC: 82 MG/DL — SIGNIFICANT CHANGE UP (ref 70–99)
HCT VFR BLD CALC: 37.4 % — SIGNIFICANT CHANGE UP (ref 37–47)
HGB BLD-MCNC: 12.2 G/DL — SIGNIFICANT CHANGE UP (ref 12–16)
IMM GRANULOCYTES NFR BLD AUTO: 0.3 % — SIGNIFICANT CHANGE UP (ref 0.1–0.3)
INR BLD: 0.97 RATIO — SIGNIFICANT CHANGE UP (ref 0.65–1.3)
LYMPHOCYTES # BLD AUTO: 1.51 K/UL — SIGNIFICANT CHANGE UP (ref 1.2–3.4)
LYMPHOCYTES # BLD AUTO: 44.9 % — SIGNIFICANT CHANGE UP (ref 20.5–51.1)
MCHC RBC-ENTMCNC: 28 PG — SIGNIFICANT CHANGE UP (ref 27–31)
MCHC RBC-ENTMCNC: 32.6 G/DL — SIGNIFICANT CHANGE UP (ref 32–37)
MCV RBC AUTO: 86 FL — SIGNIFICANT CHANGE UP (ref 81–99)
MONOCYTES # BLD AUTO: 0.26 K/UL — SIGNIFICANT CHANGE UP (ref 0.1–0.6)
MONOCYTES NFR BLD AUTO: 7.7 % — SIGNIFICANT CHANGE UP (ref 1.7–9.3)
MRSA PCR RESULT.: NEGATIVE — SIGNIFICANT CHANGE UP
NEUTROPHILS # BLD AUTO: 1.41 K/UL — SIGNIFICANT CHANGE UP (ref 1.4–6.5)
NEUTROPHILS NFR BLD AUTO: 42 % — LOW (ref 42.2–75.2)
NRBC # BLD: 0 /100 WBCS — SIGNIFICANT CHANGE UP (ref 0–0)
PLATELET # BLD AUTO: 209 K/UL — SIGNIFICANT CHANGE UP (ref 130–400)
PMV BLD: 14 FL — HIGH (ref 7.4–10.4)
POTASSIUM SERPL-MCNC: 5 MMOL/L — SIGNIFICANT CHANGE UP (ref 3.5–5)
POTASSIUM SERPL-SCNC: 5 MMOL/L — SIGNIFICANT CHANGE UP (ref 3.5–5)
PROT SERPL-MCNC: 6.3 G/DL — SIGNIFICANT CHANGE UP (ref 6–8)
PROTHROM AB SERPL-ACNC: 11.1 SEC — SIGNIFICANT CHANGE UP (ref 9.95–12.87)
RBC # BLD: 4.35 M/UL — SIGNIFICANT CHANGE UP (ref 4.2–5.4)
RBC # FLD: 13.2 % — SIGNIFICANT CHANGE UP (ref 11.5–14.5)
SODIUM SERPL-SCNC: 143 MMOL/L — SIGNIFICANT CHANGE UP (ref 135–146)
WBC # BLD: 3.36 K/UL — LOW (ref 4.8–10.8)
WBC # FLD AUTO: 3.36 K/UL — LOW (ref 4.8–10.8)

## 2024-05-07 PROCEDURE — 85025 COMPLETE CBC W/AUTO DIFF WBC: CPT

## 2024-05-07 PROCEDURE — 93010 ELECTROCARDIOGRAM REPORT: CPT

## 2024-05-07 PROCEDURE — 73562 X-RAY EXAM OF KNEE 3: CPT | Mod: 26,LT

## 2024-05-07 PROCEDURE — 85730 THROMBOPLASTIN TIME PARTIAL: CPT

## 2024-05-07 PROCEDURE — 72170 X-RAY EXAM OF PELVIS: CPT | Mod: 26

## 2024-05-07 PROCEDURE — 99214 OFFICE O/P EST MOD 30 MIN: CPT | Mod: 25

## 2024-05-07 PROCEDURE — 73562 X-RAY EXAM OF KNEE 3: CPT | Mod: LT

## 2024-05-07 PROCEDURE — 93005 ELECTROCARDIOGRAM TRACING: CPT

## 2024-05-07 PROCEDURE — 87640 STAPH A DNA AMP PROBE: CPT

## 2024-05-07 PROCEDURE — 87641 MR-STAPH DNA AMP PROBE: CPT

## 2024-05-07 PROCEDURE — 80053 COMPREHEN METABOLIC PANEL: CPT

## 2024-05-07 PROCEDURE — 72170 X-RAY EXAM OF PELVIS: CPT

## 2024-05-07 PROCEDURE — 36415 COLL VENOUS BLD VENIPUNCTURE: CPT

## 2024-05-07 PROCEDURE — 85610 PROTHROMBIN TIME: CPT

## 2024-05-07 PROCEDURE — 86900 BLOOD TYPING SEROLOGIC ABO: CPT

## 2024-05-07 PROCEDURE — 86901 BLOOD TYPING SEROLOGIC RH(D): CPT

## 2024-05-07 PROCEDURE — 86850 RBC ANTIBODY SCREEN: CPT

## 2024-05-07 PROCEDURE — 83036 HEMOGLOBIN GLYCOSYLATED A1C: CPT

## 2024-05-07 NOTE — H&P PST ADULT - HISTORY OF PRESENT ILLNESS
59 Y/O MALE PT TO PAST WITH HX OA , PT C/O WORSENING LEFT KNEE PAIN, SHARP FOR PAST 6 MO  PT NOW FOR SCHEDULED PROCEDURE ( LEFT TKR) . PT DENIES ANY CP SOB PALP COUGH DYSURIA FEVER URI.   Anesthesia Alert  NO--Difficult Airway  NO--History of neck surgery or radiation  NO--Limited ROM of neck  NO--History of Malignant hyperthermia  NO--Personal or family history of Pseudocholinesterase deficiency.  NO--Prior Anesthesia Complication  NO--Latex Allergy  NO--Loose teeth  NO--History of Rheumatoid Arthritis  NO--TURNER  NO--Bleeding risk  NO--Other_____   59 Y/O MALE PT TO PAST WITH HX OA , PT C/O WORSENING LEFT KNEE PAIN, SHARP FOR PAST 6 MO  PT NOW FOR SCHEDULED PROCEDURE ( LEFT TKR) . PT DENIES ANY CP SOB PALP COUGH DYSURIA FEVER URI.   Anesthesia Alert  LONG TERM OPOID USE 2ND TO BACK PAIN   NO--Difficult Airway  NO--History of neck surgery or radiation  NO--Limited ROM of neck  NO--History of Malignant hyperthermia  NO--Personal or family history of Pseudocholinesterase deficiency.  NO--Prior Anesthesia Complication  NO--Latex Allergy  NO--Loose teeth  NO--History of Rheumatoid Arthritis  NO--TURNER  NO--Bleeding risk  NO--Other_____   57 Y/O MALE PT TO PAST WITH HX OA , PT C/O WORSENING LEFT KNEE PAIN, SHARP FOR PAST 6 MO  PT NOW FOR SCHEDULED PROCEDURE ( LEFT TKR) . PT DENIES ANY CP SOB PALP COUGH DYSURIA FEVER URI.   Anesthesia Alert  LONG TERM OPOID USE 2ND TO BACK PAIN   NO--Difficult Airway  NO--History of neck surgery or radiation  NO--Limited ROM of neck  NO--History of Malignant hyperthermia  NO--Personal or family history of Pseudocholinesterase deficiency.  NO--Prior Anesthesia Complication  NO--Latex Allergy  NO--Loose teeth  NO--History of Rheumatoid Arthritis  NO--TURNER  NO--Bleeding risk  NO--Other_____  RCRI CLASS I  DASI 5.3 METS

## 2024-05-07 NOTE — H&P PST ADULT - NSICDXPASTSURGICALHX_GEN_ALL_CORE_FT
PAST SURGICAL HISTORY:  Previous back surgery L5 S1-S2    S/P laparoscopic cholecystectomy     S/P right knee surgery

## 2024-05-07 NOTE — H&P PST ADULT - NSICDXPASTMEDICALHX_GEN_ALL_CORE_FT
PAST MEDICAL HISTORY:  Right knee pain      PAST MEDICAL HISTORY:  Anxiety     OA (osteoarthritis)     Right knee pain      PAST MEDICAL HISTORY:  Anxiety     Back pain     OA (osteoarthritis)     Right knee pain

## 2024-05-08 DIAGNOSIS — M17.12 UNILATERAL PRIMARY OSTEOARTHRITIS, LEFT KNEE: ICD-10-CM

## 2024-05-08 DIAGNOSIS — Z01.818 ENCOUNTER FOR OTHER PREPROCEDURAL EXAMINATION: ICD-10-CM

## 2024-05-13 PROBLEM — M54.9 DORSALGIA, UNSPECIFIED: Chronic | Status: ACTIVE | Noted: 2024-05-08

## 2024-05-13 PROBLEM — M19.90 UNSPECIFIED OSTEOARTHRITIS, UNSPECIFIED SITE: Chronic | Status: ACTIVE | Noted: 2024-05-07

## 2024-05-14 RX ORDER — PREGABALIN 200 MG/1
200 CAPSULE ORAL EVERY 8 HOURS
Qty: 270 | Refills: 1 | Status: ACTIVE | COMMUNITY
Start: 2023-11-02 | End: 1900-01-01

## 2024-05-24 ENCOUNTER — APPOINTMENT (OUTPATIENT)
Dept: ORTHOPEDIC SURGERY | Facility: HOSPITAL | Age: 59
End: 2024-05-24

## 2024-05-24 ENCOUNTER — INPATIENT (INPATIENT)
Facility: HOSPITAL | Age: 59
LOS: 0 days | Discharge: HOME HEALTH | DRG: 470 | End: 2024-05-25
Attending: ORTHOPAEDIC SURGERY | Admitting: ORTHOPAEDIC SURGERY
Payer: MEDICARE

## 2024-05-24 ENCOUNTER — TRANSCRIPTION ENCOUNTER (OUTPATIENT)
Age: 59
End: 2024-05-24

## 2024-05-24 ENCOUNTER — RESULT REVIEW (OUTPATIENT)
Age: 59
End: 2024-05-24

## 2024-05-24 VITALS
TEMPERATURE: 99 F | SYSTOLIC BLOOD PRESSURE: 130 MMHG | DIASTOLIC BLOOD PRESSURE: 88 MMHG | RESPIRATION RATE: 18 BRPM | HEIGHT: 62 IN | OXYGEN SATURATION: 95 % | HEART RATE: 95 BPM | WEIGHT: 207.9 LBS

## 2024-05-24 DIAGNOSIS — M17.12 UNILATERAL PRIMARY OSTEOARTHRITIS, LEFT KNEE: ICD-10-CM

## 2024-05-24 DIAGNOSIS — Z90.49 ACQUIRED ABSENCE OF OTHER SPECIFIED PARTS OF DIGESTIVE TRACT: Chronic | ICD-10-CM

## 2024-05-24 DIAGNOSIS — Z98.890 OTHER SPECIFIED POSTPROCEDURAL STATES: Chronic | ICD-10-CM

## 2024-05-24 LAB — GLUCOSE BLDC GLUCOMTR-MCNC: 87 MG/DL — SIGNIFICANT CHANGE UP (ref 70–99)

## 2024-05-24 PROCEDURE — C1889: CPT

## 2024-05-24 PROCEDURE — 97110 THERAPEUTIC EXERCISES: CPT | Mod: GP

## 2024-05-24 PROCEDURE — 88305 TISSUE EXAM BY PATHOLOGIST: CPT

## 2024-05-24 PROCEDURE — 97162 PT EVAL MOD COMPLEX 30 MIN: CPT | Mod: GP

## 2024-05-24 PROCEDURE — 73560 X-RAY EXAM OF KNEE 1 OR 2: CPT | Mod: 26,LT

## 2024-05-24 PROCEDURE — 85027 COMPLETE CBC AUTOMATED: CPT

## 2024-05-24 PROCEDURE — 73560 X-RAY EXAM OF KNEE 1 OR 2: CPT | Mod: LT

## 2024-05-24 PROCEDURE — 97116 GAIT TRAINING THERAPY: CPT | Mod: GP

## 2024-05-24 PROCEDURE — 88311 DECALCIFY TISSUE: CPT | Mod: 26

## 2024-05-24 PROCEDURE — 88311 DECALCIFY TISSUE: CPT

## 2024-05-24 PROCEDURE — C9290: CPT

## 2024-05-24 PROCEDURE — C9399: CPT

## 2024-05-24 PROCEDURE — C1776: CPT

## 2024-05-24 PROCEDURE — 97166 OT EVAL MOD COMPLEX 45 MIN: CPT | Mod: GO

## 2024-05-24 PROCEDURE — C1713: CPT

## 2024-05-24 PROCEDURE — 27447 TOTAL KNEE ARTHROPLASTY: CPT | Mod: LT

## 2024-05-24 PROCEDURE — 94010 BREATHING CAPACITY TEST: CPT

## 2024-05-24 PROCEDURE — 36415 COLL VENOUS BLD VENIPUNCTURE: CPT

## 2024-05-24 PROCEDURE — 88305 TISSUE EXAM BY PATHOLOGIST: CPT | Mod: 26

## 2024-05-24 PROCEDURE — 80048 BASIC METABOLIC PNL TOTAL CA: CPT

## 2024-05-24 RX ORDER — IBUPROFEN 200 MG
1 TABLET ORAL
Qty: 42 | Refills: 0
Start: 2024-05-24 | End: 2024-06-06

## 2024-05-24 RX ORDER — POLYETHYLENE GLYCOL 3350 17 G/17G
17 POWDER, FOR SOLUTION ORAL AT BEDTIME
Refills: 0 | Status: DISCONTINUED | OUTPATIENT
Start: 2024-05-24 | End: 2024-05-25

## 2024-05-24 RX ORDER — ACETAMINOPHEN 500 MG
2 TABLET ORAL
Qty: 0 | Refills: 0 | DISCHARGE
Start: 2024-05-24

## 2024-05-24 RX ORDER — ACETAMINOPHEN 500 MG
1000 TABLET ORAL ONCE
Refills: 0 | Status: COMPLETED | OUTPATIENT
Start: 2024-05-24 | End: 2024-05-24

## 2024-05-24 RX ORDER — OXYCODONE HYDROCHLORIDE 5 MG/1
30 TABLET ORAL EVERY 4 HOURS
Refills: 0 | Status: DISCONTINUED | OUTPATIENT
Start: 2024-05-24 | End: 2024-05-25

## 2024-05-24 RX ORDER — ALPRAZOLAM 0.25 MG
2 TABLET ORAL EVERY 8 HOURS
Refills: 0 | Status: DISCONTINUED | OUTPATIENT
Start: 2024-05-24 | End: 2024-05-25

## 2024-05-24 RX ORDER — OXYCODONE HYDROCHLORIDE 5 MG/1
30 TABLET ORAL EVERY 12 HOURS
Refills: 0 | Status: DISCONTINUED | OUTPATIENT
Start: 2024-05-24 | End: 2024-05-25

## 2024-05-24 RX ORDER — HYDROMORPHONE HYDROCHLORIDE 2 MG/ML
0.5 INJECTION INTRAMUSCULAR; INTRAVENOUS; SUBCUTANEOUS
Refills: 0 | Status: DISCONTINUED | OUTPATIENT
Start: 2024-05-24 | End: 2024-05-24

## 2024-05-24 RX ORDER — ONDANSETRON 8 MG/1
4 TABLET, FILM COATED ORAL ONCE
Refills: 0 | Status: DISCONTINUED | OUTPATIENT
Start: 2024-05-24 | End: 2024-05-24

## 2024-05-24 RX ORDER — OXYCODONE HYDROCHLORIDE 5 MG/1
30 TABLET ORAL EVERY 12 HOURS
Refills: 0 | Status: DISCONTINUED | OUTPATIENT
Start: 2024-05-24 | End: 2024-05-24

## 2024-05-24 RX ORDER — ASPIRIN/CALCIUM CARB/MAGNESIUM 324 MG
81 TABLET ORAL EVERY 12 HOURS
Refills: 0 | Status: DISCONTINUED | OUTPATIENT
Start: 2024-05-24 | End: 2024-05-25

## 2024-05-24 RX ORDER — ALPRAZOLAM 0.25 MG
2 TABLET ORAL THREE TIMES A DAY
Refills: 0 | Status: DISCONTINUED | OUTPATIENT
Start: 2024-05-24 | End: 2024-05-24

## 2024-05-24 RX ORDER — ACETAMINOPHEN 500 MG
650 TABLET ORAL EVERY 6 HOURS
Refills: 0 | Status: DISCONTINUED | OUTPATIENT
Start: 2024-05-24 | End: 2024-05-25

## 2024-05-24 RX ORDER — KETOROLAC TROMETHAMINE 30 MG/ML
15 SYRINGE (ML) INJECTION EVERY 6 HOURS
Refills: 0 | Status: DISCONTINUED | OUTPATIENT
Start: 2024-05-24 | End: 2024-05-25

## 2024-05-24 RX ORDER — SENNA PLUS 8.6 MG/1
2 TABLET ORAL
Qty: 28 | Refills: 0
Start: 2024-05-24 | End: 2024-06-06

## 2024-05-24 RX ORDER — PANTOPRAZOLE SODIUM 20 MG/1
1 TABLET, DELAYED RELEASE ORAL
Qty: 30 | Refills: 0
Start: 2024-05-24 | End: 2024-06-22

## 2024-05-24 RX ORDER — MIDAZOLAM HYDROCHLORIDE 1 MG/ML
2 INJECTION, SOLUTION INTRAMUSCULAR; INTRAVENOUS ONCE
Refills: 0 | Status: DISCONTINUED | OUTPATIENT
Start: 2024-05-24 | End: 2024-05-24

## 2024-05-24 RX ORDER — DEXAMETHASONE 0.5 MG/5ML
4 ELIXIR ORAL ONCE
Refills: 0 | Status: COMPLETED | OUTPATIENT
Start: 2024-05-25 | End: 2024-05-25

## 2024-05-24 RX ORDER — CELECOXIB 200 MG/1
400 CAPSULE ORAL ONCE
Refills: 0 | Status: COMPLETED | OUTPATIENT
Start: 2024-05-24 | End: 2024-05-24

## 2024-05-24 RX ORDER — CHLORHEXIDINE GLUCONATE 213 G/1000ML
1 SOLUTION TOPICAL
Refills: 0 | Status: DISCONTINUED | OUTPATIENT
Start: 2024-05-24 | End: 2024-05-25

## 2024-05-24 RX ORDER — PANTOPRAZOLE SODIUM 20 MG/1
40 TABLET, DELAYED RELEASE ORAL
Refills: 0 | Status: DISCONTINUED | OUTPATIENT
Start: 2024-05-24 | End: 2024-05-25

## 2024-05-24 RX ORDER — ALPRAZOLAM 0.25 MG
1 TABLET ORAL
Refills: 0 | DISCHARGE

## 2024-05-24 RX ORDER — ONDANSETRON 8 MG/1
4 TABLET, FILM COATED ORAL EVERY 6 HOURS
Refills: 0 | Status: DISCONTINUED | OUTPATIENT
Start: 2024-05-24 | End: 2024-05-25

## 2024-05-24 RX ORDER — SODIUM CHLORIDE 9 MG/ML
1000 INJECTION INTRAMUSCULAR; INTRAVENOUS; SUBCUTANEOUS
Refills: 0 | Status: DISCONTINUED | OUTPATIENT
Start: 2024-05-24 | End: 2024-05-25

## 2024-05-24 RX ORDER — MAGNESIUM HYDROXIDE 400 MG/1
30 TABLET, CHEWABLE ORAL DAILY
Refills: 0 | Status: DISCONTINUED | OUTPATIENT
Start: 2024-05-24 | End: 2024-05-25

## 2024-05-24 RX ORDER — SENNA PLUS 8.6 MG/1
2 TABLET ORAL AT BEDTIME
Refills: 0 | Status: DISCONTINUED | OUTPATIENT
Start: 2024-05-24 | End: 2024-05-25

## 2024-05-24 RX ORDER — ALPRAZOLAM 0.25 MG
1 TABLET ORAL
Qty: 0 | Refills: 0 | DISCHARGE

## 2024-05-24 RX ORDER — CELECOXIB 200 MG/1
200 CAPSULE ORAL EVERY 12 HOURS
Refills: 0 | Status: DISCONTINUED | OUTPATIENT
Start: 2024-05-26 | End: 2024-05-25

## 2024-05-24 RX ORDER — HYDROMORPHONE HYDROCHLORIDE 2 MG/ML
1 INJECTION INTRAMUSCULAR; INTRAVENOUS; SUBCUTANEOUS
Refills: 0 | Status: DISCONTINUED | OUTPATIENT
Start: 2024-05-24 | End: 2024-05-24

## 2024-05-24 RX ORDER — ASPIRIN/CALCIUM CARB/MAGNESIUM 324 MG
1 TABLET ORAL
Qty: 70 | Refills: 0
Start: 2024-05-24 | End: 2024-06-27

## 2024-05-24 RX ORDER — CEFAZOLIN SODIUM 1 G
2000 VIAL (EA) INJECTION EVERY 8 HOURS
Refills: 0 | Status: COMPLETED | OUTPATIENT
Start: 2024-05-24 | End: 2024-05-25

## 2024-05-24 RX ORDER — SODIUM CHLORIDE 9 MG/ML
1000 INJECTION, SOLUTION INTRAVENOUS
Refills: 0 | Status: DISCONTINUED | OUTPATIENT
Start: 2024-05-24 | End: 2024-05-24

## 2024-05-24 RX ORDER — OXYCODONE HYDROCHLORIDE 5 MG/1
1 TABLET ORAL
Refills: 0 | DISCHARGE

## 2024-05-24 RX ORDER — OXYMORPHONE HYDROCHLORIDE 10 MG/1
1 TABLET, EXTENDED RELEASE ORAL
Refills: 0 | DISCHARGE

## 2024-05-24 RX ORDER — OXYCODONE HYDROCHLORIDE 5 MG/1
5 TABLET ORAL ONCE
Refills: 0 | Status: DISCONTINUED | OUTPATIENT
Start: 2024-05-24 | End: 2024-05-24

## 2024-05-24 RX ADMIN — Medication 200 MILLIGRAM(S): at 14:32

## 2024-05-24 RX ADMIN — HYDROMORPHONE HYDROCHLORIDE 1 MILLIGRAM(S): 2 INJECTION INTRAMUSCULAR; INTRAVENOUS; SUBCUTANEOUS at 12:29

## 2024-05-24 RX ADMIN — SENNA PLUS 2 TABLET(S): 8.6 TABLET ORAL at 21:37

## 2024-05-24 RX ADMIN — HYDROMORPHONE HYDROCHLORIDE 1 MILLIGRAM(S): 2 INJECTION INTRAMUSCULAR; INTRAVENOUS; SUBCUTANEOUS at 13:13

## 2024-05-24 RX ADMIN — Medication 200 MILLIGRAM(S): at 21:36

## 2024-05-24 RX ADMIN — OXYCODONE HYDROCHLORIDE 30 MILLIGRAM(S): 5 TABLET ORAL at 23:18

## 2024-05-24 RX ADMIN — Medication 1000 MILLIGRAM(S): at 08:26

## 2024-05-24 RX ADMIN — Medication 15 MILLIGRAM(S): at 17:18

## 2024-05-24 RX ADMIN — Medication 650 MILLIGRAM(S): at 18:04

## 2024-05-24 RX ADMIN — OXYCODONE HYDROCHLORIDE 30 MILLIGRAM(S): 5 TABLET ORAL at 22:48

## 2024-05-24 RX ADMIN — CELECOXIB 400 MILLIGRAM(S): 200 CAPSULE ORAL at 08:26

## 2024-05-24 RX ADMIN — HYDROMORPHONE HYDROCHLORIDE 1 MILLIGRAM(S): 2 INJECTION INTRAMUSCULAR; INTRAVENOUS; SUBCUTANEOUS at 12:51

## 2024-05-24 RX ADMIN — Medication 81 MILLIGRAM(S): at 17:18

## 2024-05-24 RX ADMIN — Medication 15 MILLIGRAM(S): at 18:04

## 2024-05-24 RX ADMIN — OXYCODONE HYDROCHLORIDE 30 MILLIGRAM(S): 5 TABLET ORAL at 18:04

## 2024-05-24 RX ADMIN — Medication 650 MILLIGRAM(S): at 17:18

## 2024-05-24 RX ADMIN — Medication 100 MILLIGRAM(S): at 17:18

## 2024-05-24 RX ADMIN — OXYCODONE HYDROCHLORIDE 30 MILLIGRAM(S): 5 TABLET ORAL at 17:18

## 2024-05-24 RX ADMIN — SODIUM CHLORIDE 100 MILLILITER(S): 9 INJECTION, SOLUTION INTRAVENOUS at 12:50

## 2024-05-24 RX ADMIN — POLYETHYLENE GLYCOL 3350 17 GRAM(S): 17 POWDER, FOR SOLUTION ORAL at 21:41

## 2024-05-24 RX ADMIN — MIDAZOLAM HYDROCHLORIDE 2 MILLIGRAM(S): 1 INJECTION, SOLUTION INTRAMUSCULAR; INTRAVENOUS at 13:09

## 2024-05-24 RX ADMIN — Medication 1000 MILLIGRAM(S): at 13:07

## 2024-05-24 RX ADMIN — Medication 400 MILLIGRAM(S): at 13:07

## 2024-05-24 NOTE — PROGRESS NOTE ADULT - ASSESSMENT
s/p left tkr pod 0     pain control   dvt proph   am labs   pt ot   incentive   abx 24 hours   med cs   dispo planning

## 2024-05-24 NOTE — ASU PREOP CHECKLIST - ASSESSMENT, HISTORY & PHYSICAL COMPLETED AND ON MEDICAL RECORD
Pt is calling needs bp Norvasc medication pt is completely out wants to know if someone's else can send in today. Please let pt know either  Pt # [phone]   
done

## 2024-05-24 NOTE — PATIENT PROFILE ADULT - FALL HARM RISK - HARM RISK INTERVENTIONS
Assistance with ambulation/Assistance OOB with selected safe patient handling equipment/Communicate Risk of Fall with Harm to all staff/Discuss with provider need for PT consult/Monitor gait and stability/Provide patient with walking aids - walker, cane, crutches/Reinforce activity limits and safety measures with patient and family/Sit up slowly, dangle for a short time, stand at bedside before walking/Tailored Fall Risk Interventions/Use of alarms - bed, chair and/or voice tab/Visual Cue: Yellow wristband and red socks/Bed in lowest position, wheels locked, appropriate side rails in place/Call bell, personal items and telephone in reach/Instruct patient to call for assistance before getting out of bed or chair/Non-slip footwear when patient is out of bed/[place] to call system/Physically safe environment - no spills, clutter or unnecessary equipment/Purposeful Proactive Rounding/Room/bathroom lighting operational, light cord in reach

## 2024-05-24 NOTE — DISCHARGE NOTE PROVIDER - NSDCCPCAREPLAN_GEN_ALL_CORE_FT
PRINCIPAL DISCHARGE DIAGNOSIS  Diagnosis: Arthropathy of left knee  Assessment and Plan of Treatment: REMOVE SURGICAL DRESSING 1 WEEK AFTER SURGERY.  OK TO SHOWER, DO NOT SATURATE, PAT DRY.  ONCE REMOVED DO NOT APPLY ANY LOTIONS OR CREAMS TO INCISION.  iF INCREASED PAIN FEVER SWELLING OR DISCHARGE/BLEEDING CALL MD OFFICE.  STAPLE REMOVAL IN OFFICE 2 WEEKS POST OP.  CALL FOR APPOINTMENT.  PLEASE TAKE ASPIRIN 81MG EVERY 12 HOURS FOR THE NEXT 35 DAYS FOR BLOOD CLOT PREVENTION.  ICE TO KNEE PLACE OVER A TOWEL, 20 MINUTES ON 20 MINUTES OFF.  PAIN MEDS AS PRESCRIBED.  CAN TAKE OVER THE COUNTER TYLENOL.  PROTONIX FOR HEARTBURN PREVENTION.  SENNA STOOL SOFTENER AND NEEDED.  WEIGHT BEARING AS TOLERATED WITH A WALKER.  CALL OFFICE FOR APPOINTMENT. PAIN MEDS AS PER HOME MEDS     PRINCIPAL DISCHARGE DIAGNOSIS  Diagnosis: Arthropathy of left knee  Assessment and Plan of Treatment: REMOVE SURGICAL DRESSING 1 WEEK AFTER SURGERY.  OK TO SHOWER, DO NOT SATURATE, PAT DRY.  ONCE REMOVED DO NOT APPLY ANY LOTIONS OR CREAMS TO INCISION.  iF INCREASED PAIN FEVER SWELLING OR DISCHARGE/BLEEDING CALL MD OFFICE.  STAPLE REMOVAL IN OFFICE 2 WEEKS POST OP.  CALL FOR APPOINTMENT.  PLEASE TAKE ASPIRIN 81MG EVERY 12 HOURS FOR THE NEXT 35 DAYS FOR BLOOD CLOT PREVENTION.  ICE TO KNEE PLACE OVER A TOWEL, 20 MINUTES ON 20 MINUTES OFF.  PAIN MEDS AS PRESCRIBED.  CAN TAKE OVER THE COUNTER TYLENOL.  PROTONIX FOR HEARTBURN PREVENTION.  SENNA STOOL SOFTENER AND NEEDED.  WEIGHT BEARING AS TOLERATED WITH A WALKER.  CALL OFFICE FOR APPOINTMENT. PAIN MEDS AS PER HOME MEDS      SECONDARY DISCHARGE DIAGNOSES  Diagnosis: Increased potassium in the blood  Assessment and Plan of Treatment: Mildly increased Potassium on discharge:  level 5.0 to 5.2 ,   limit your Potassium intake, inform your  PMD of this present level with your next visit

## 2024-05-24 NOTE — ASU PATIENT PROFILE, ADULT - NSICDXPASTSURGICALHX_GEN_ALL_CORE_FT
PAST SURGICAL HISTORY:  Previous back surgery L5 S1-S2    S/P laparoscopic cholecystectomy     S/P right knee surgery tkr

## 2024-05-24 NOTE — PHYSICAL THERAPY INITIAL EVALUATION ADULT - IMPAIRMENTS CONTRIBUTING TO GAIT DEVIATIONS, PT EVAL
decreased endurance/impaired balance/narrow base of support/pain/impaired postural control/decreased ROM/decreased strength

## 2024-05-24 NOTE — PHYSICAL THERAPY INITIAL EVALUATION ADULT - GENERAL OBSERVATIONS, REHAB EVAL
15:18-15:43 Chart reviewed. Pt encountered supine in bed, may be seen by Physical Therapist as confirmed with Nurse. Patient reported " 9/10" pain on (L) knee but ready to try to walk to bathroom now; +Ace wrap LLE/ IV lock LUE/ compression socks. sequentials./ healed surgical scar (R) knee

## 2024-05-24 NOTE — DISCHARGE NOTE PROVIDER - NSDCMRMEDTOKEN_GEN_ALL_CORE_FT
acetaminophen 325 mg oral tablet: 2 tab(s) orally every 6 hours  ALPRAZolam 2 mg oral tablet: 1 tab(s) orally every 8 hours as needed for  anxiety every 8 hours AS NEEDED  aspirin 81 mg oral delayed release tablet: 1 tab(s) orally every 12 hours BLOOD CLOT PREVENTION   mg oral tablet: 1 tab(s) orally every 8 hours as needed for  severe pain TAKE WITH FOOD  oxyCODONE 30 mg oral tablet: 1 tab(s) orally every 4 hours as needed for  moderate pain  oxyMORphone 40 mg oral tablet, extended release: 1 tab(s) orally every 8 hours  pantoprazole 40 mg oral delayed release tablet: 1 tab(s) orally once a day (before a meal) GERD PREVENTION  pregabalin 200 mg oral capsule: 1 cap(s) orally 3 times a day  senna leaf extract oral tablet: 2 tab(s) orally once a day (at bedtime) STOOL SOFTENERS

## 2024-05-24 NOTE — PHYSICAL THERAPY INITIAL EVALUATION ADULT - ADDITIONAL COMMENTS
Per patient, there are 2 steps outside with (L) rail going down then one steps with (L) rail going up to enter home, also has 2 steps with (L) rail going up to living room; has rolling walker from previous surgery

## 2024-05-24 NOTE — BRIEF OPERATIVE NOTE - SECOND ASSIST LEVEL OF PARTICIPATION
Full Procedure
I personally examined this patient and provided care in conjunction with the resident.

## 2024-05-24 NOTE — PROGRESS NOTE ADULT - SUBJECTIVE AND OBJECTIVE BOX
post op note     s/p left tkr pod 0   pt seen at bedside,   resting comfortable pain controlled     Vital Signs Last 24 Hrs  T(C): 37 (24 May 2024 14:17), Max: 37.4 (24 May 2024 07:34)  T(F): 98.6 (24 May 2024 14:17), Max: 99.3 (24 May 2024 07:34)  HR: 77 (24 May 2024 14:17) (70 - 95)  BP: 146/98 (24 May 2024 14:17) (130/88 - 208/100)  BP(mean): --  RR: 16 (24 May 2024 14:17) (16 - 23)  SpO2: 100% (24 May 2024 14:17) (95% - 100%)    left lower extremity:   dressing in place c/d/i  nvid df/pf intact   farrah and ipc in place b/l   calf soft nt

## 2024-05-24 NOTE — DISCHARGE NOTE PROVIDER - NSDCFUSCHEDAPPT_GEN_ALL_CORE_FT
Chanelle-Familia Patel  [facility]  [address]  Scheduled Appointment: 06/13/2024    Chetna Watts  [facility]  [address]  Scheduled Appointment: 08/21/2024

## 2024-05-24 NOTE — DISCHARGE NOTE PROVIDER - NSDCFUADDINST_GEN_ALL_CORE_FT
REMOVE SURGICAL DRESSING 1 WEEK AFTER SURGERY.  OK TO SHOWER, DO NOT SATURATE, PAT DRY.  ONCE REMOVED DO NOT APPLY ANY LOTIONS OR CREAMS TO INCISION.  iF INCREASED PAIN FEVER SWELLING OR DISCHARGE/BLEEDING CALL MD OFFICE.  STAPLE REMOVAL IN OFFICE 2 WEEKS POST OP.  CALL FOR APPOINTMENT.  PLEASE TAKE ASPIRIN 81MG EVERY 12 HOURS FOR THE NEXT 35 DAYS FOR BLOOD CLOT PREVENTION.  ICE TO KNEE PLACE OVER A TOWEL, 20 MINUTES ON 20 MINUTES OFF.  PAIN MEDS AS PRESCRIBED.  CAN TAKE OVER THE COUNTER TYLENOL.  PROTONIX FOR HEARTBURN PREVENTION.  SENNA STOOL SOFTENER AND NEEDED.  WEIGHT BEARING AS TOLERATED WITH A WALKER.  CALL OFFICE FOR APPOINTMENT. PAIN MEDS AS PER HOME MEDS

## 2024-05-24 NOTE — PHARMACY COMMUNICATION NOTE - COMMENTS
confirmed the dose with deysi Waddell . KALYANI Del Castillo wants patient on 30mg of oxycodone 30mg q4hprn

## 2024-05-24 NOTE — DISCHARGE NOTE PROVIDER - HOSPITAL COURSE
on 5/24/2024 pt underwent a left total knee replacement with dr vania nguyen.   pt tolerated the procedure well with no complications.    pt was transferred to recovery unit in stable condition then to the floor.   pt received antibiotic prophylaxis 24 hours and dvt prophylaxis on 5/24/2024 pt underwent a left total knee replacement with dr vania nguyen.   pt tolerated the procedure well with no complications.    pt was transferred to recovery unit in stable condition then to the floor.   pt received antibiotic prophylaxis 24 hours and dvt prophylaxis.    - Potassium 5  to 5.2 on discharge date, given Lokelma 10gm PO x 1 dose, Discharge instructions instructed patient to limit Potassium intake and to inform her PMD of this mildly elevated Potassium from base line of 5.0

## 2024-05-24 NOTE — PHYSICAL THERAPY INITIAL EVALUATION ADULT - MANUAL MUSCLE TESTING RESULTS, REHAB EVAL
RLE ms strength grossly graded 3 to 3+/5; (L) hip/ankle 3- to 3/5; (L) knee 2+ to 3-/5; Please refer to OT rashid for BUE

## 2024-05-24 NOTE — ASU PATIENT PROFILE, ADULT - FALL HARM RISK - HARM RISK INTERVENTIONS

## 2024-05-24 NOTE — PHYSICAL THERAPY INITIAL EVALUATION ADULT - GAIT DEVIATIONS NOTED, PT EVAL
stooped posture, dec heel strike/pushoff /stance on LLE with (L) hip/knee kept flexed/decreased nicoalsa/decreased step length/decreased weight-shifting ability

## 2024-05-24 NOTE — PHYSICAL THERAPY INITIAL EVALUATION ADULT - ACTIVE RANGE OF MOTION EXAMINATION, REHAB EVAL
LLE required AAROM/AROM with (L) knee flexion 0-50 degrees in supine, Please refer to OT eval for BUE/Right LE Active ROM was WFL (within functional limits)

## 2024-05-24 NOTE — ASU PATIENT PROFILE, ADULT - NSICDXPASTMEDICALHX_GEN_ALL_CORE_FT
PAST MEDICAL HISTORY:  Anxiety and depression    Back pain     OA (osteoarthritis)     Obesity with body mass index (BMI) of 30.0 to 39.9     Right knee pain

## 2024-05-24 NOTE — DISCHARGE NOTE PROVIDER - CARE PROVIDER_API CALL
Familia Vogel  Orthopaedic Surgery  [address] [facility] [facility] [place] [address]  Phone: [phone]  Fax: [phone]  Follow Up Time:

## 2024-05-24 NOTE — PHYSICAL THERAPY INITIAL EVALUATION ADULT - IMPAIRMENTS CONTRIBUTING IMPAIRED BED MOBILITY, REHAB EVAL
decreased endurance/impaired balance/pain/impaired postural control/decreased ROM/decreased strength

## 2024-05-25 ENCOUNTER — TRANSCRIPTION ENCOUNTER (OUTPATIENT)
Age: 59
End: 2024-05-25

## 2024-05-25 VITALS
OXYGEN SATURATION: 95 % | DIASTOLIC BLOOD PRESSURE: 85 MMHG | RESPIRATION RATE: 18 BRPM | TEMPERATURE: 98 F | HEART RATE: 76 BPM | SYSTOLIC BLOOD PRESSURE: 118 MMHG

## 2024-05-25 LAB
ANION GAP SERPL CALC-SCNC: 5 MMOL/L — LOW (ref 7–14)
BUN SERPL-MCNC: 14 MG/DL — SIGNIFICANT CHANGE UP (ref 10–20)
CALCIUM SERPL-MCNC: 8.5 MG/DL — SIGNIFICANT CHANGE UP (ref 8.4–10.5)
CHLORIDE SERPL-SCNC: 105 MMOL/L — SIGNIFICANT CHANGE UP (ref 98–110)
CO2 SERPL-SCNC: 32 MMOL/L — SIGNIFICANT CHANGE UP (ref 17–32)
CREAT SERPL-MCNC: 1 MG/DL — SIGNIFICANT CHANGE UP (ref 0.7–1.5)
EGFR: 65 ML/MIN/1.73M2 — SIGNIFICANT CHANGE UP
GLUCOSE SERPL-MCNC: 111 MG/DL — HIGH (ref 70–99)
HCT VFR BLD CALC: 29.6 % — LOW (ref 37–47)
HGB BLD-MCNC: 9.9 G/DL — LOW (ref 12–16)
MCHC RBC-ENTMCNC: 28.9 PG — SIGNIFICANT CHANGE UP (ref 27–31)
MCHC RBC-ENTMCNC: 33.4 G/DL — SIGNIFICANT CHANGE UP (ref 32–37)
MCV RBC AUTO: 86.3 FL — SIGNIFICANT CHANGE UP (ref 81–99)
NRBC # BLD: 0 /100 WBCS — SIGNIFICANT CHANGE UP (ref 0–0)
PLATELET # BLD AUTO: 137 K/UL — SIGNIFICANT CHANGE UP (ref 130–400)
PMV BLD: 13.1 FL — HIGH (ref 7.4–10.4)
POTASSIUM SERPL-MCNC: 5.2 MMOL/L — HIGH (ref 3.5–5)
POTASSIUM SERPL-SCNC: 5.2 MMOL/L — HIGH (ref 3.5–5)
RBC # BLD: 3.43 M/UL — LOW (ref 4.2–5.4)
RBC # FLD: 12.7 % — SIGNIFICANT CHANGE UP (ref 11.5–14.5)
SODIUM SERPL-SCNC: 142 MMOL/L — SIGNIFICANT CHANGE UP (ref 135–146)
WBC # BLD: 5.21 K/UL — SIGNIFICANT CHANGE UP (ref 4.8–10.8)
WBC # FLD AUTO: 5.21 K/UL — SIGNIFICANT CHANGE UP (ref 4.8–10.8)

## 2024-05-25 PROCEDURE — 99231 SBSQ HOSP IP/OBS SF/LOW 25: CPT

## 2024-05-25 RX ORDER — SODIUM ZIRCONIUM CYCLOSILICATE 10 G/10G
10 POWDER, FOR SUSPENSION ORAL ONCE
Refills: 0 | Status: COMPLETED | OUTPATIENT
Start: 2024-05-25 | End: 2024-05-25

## 2024-05-25 RX ADMIN — Medication 650 MILLIGRAM(S): at 05:44

## 2024-05-25 RX ADMIN — Medication 650 MILLIGRAM(S): at 11:36

## 2024-05-25 RX ADMIN — Medication 650 MILLIGRAM(S): at 11:29

## 2024-05-25 RX ADMIN — Medication 650 MILLIGRAM(S): at 00:00

## 2024-05-25 RX ADMIN — CHLORHEXIDINE GLUCONATE 1 APPLICATION(S): 213 SOLUTION TOPICAL at 05:45

## 2024-05-25 RX ADMIN — Medication 15 MILLIGRAM(S): at 11:36

## 2024-05-25 RX ADMIN — Medication 4 MILLIGRAM(S): at 09:38

## 2024-05-25 RX ADMIN — Medication 100 MILLIGRAM(S): at 01:00

## 2024-05-25 RX ADMIN — OXYCODONE HYDROCHLORIDE 30 MILLIGRAM(S): 5 TABLET ORAL at 08:30

## 2024-05-25 RX ADMIN — OXYCODONE HYDROCHLORIDE 30 MILLIGRAM(S): 5 TABLET ORAL at 08:57

## 2024-05-25 RX ADMIN — Medication 15 MILLIGRAM(S): at 00:30

## 2024-05-25 RX ADMIN — Medication 15 MILLIGRAM(S): at 06:14

## 2024-05-25 RX ADMIN — SODIUM CHLORIDE 75 MILLILITER(S): 9 INJECTION INTRAMUSCULAR; INTRAVENOUS; SUBCUTANEOUS at 00:22

## 2024-05-25 RX ADMIN — Medication 650 MILLIGRAM(S): at 06:14

## 2024-05-25 RX ADMIN — SODIUM CHLORIDE 75 MILLILITER(S): 9 INJECTION INTRAMUSCULAR; INTRAVENOUS; SUBCUTANEOUS at 00:14

## 2024-05-25 RX ADMIN — Medication 15 MILLIGRAM(S): at 05:44

## 2024-05-25 RX ADMIN — Medication 15 MILLIGRAM(S): at 00:00

## 2024-05-25 RX ADMIN — Medication 200 MILLIGRAM(S): at 13:29

## 2024-05-25 RX ADMIN — Medication 15 MILLIGRAM(S): at 11:29

## 2024-05-25 RX ADMIN — Medication 200 MILLIGRAM(S): at 05:43

## 2024-05-25 RX ADMIN — OXYCODONE HYDROCHLORIDE 30 MILLIGRAM(S): 5 TABLET ORAL at 13:29

## 2024-05-25 RX ADMIN — Medication 650 MILLIGRAM(S): at 00:30

## 2024-05-25 RX ADMIN — Medication 81 MILLIGRAM(S): at 05:44

## 2024-05-25 RX ADMIN — Medication 2 MILLIGRAM(S): at 00:07

## 2024-05-25 RX ADMIN — SODIUM ZIRCONIUM CYCLOSILICATE 10 GRAM(S): 10 POWDER, FOR SUSPENSION ORAL at 11:29

## 2024-05-25 RX ADMIN — PANTOPRAZOLE SODIUM 40 MILLIGRAM(S): 20 TABLET, DELAYED RELEASE ORAL at 05:43

## 2024-05-25 NOTE — CONSULT NOTE ADULT - SUBJECTIVE AND OBJECTIVE BOX
SUBJECTIVE:    Patient is a 58y old Female who presents with a chief complaint of left knee replacement  (24 May 2024 16:44)    Currently admitted to medicine with the primary diagnosis of Arthropathy of left knee       Today is hospital day 1d. This morning she is resting comfortably in bed and reports no new issues or overnight events.     ROS:   CONSTITUTIONAL: No weakness, fevers or chills   EYES/ENT: No visual changes; No vertigo or throat pain   NECK: No pain or stiffness   RESPIRATORY: No cough, wheezing, hemoptysis; No shortness of breath   CARDIOVASCULAR: No chest pain or palpitations   GASTROINTESTINAL: No abdominal or epigastric pain. No nausea, vomiting, or hematemesis; No diarrhea or constipation. No melena or hematochezia.  GENITOURINARY: No dysuria, frequency or hematuria  NEUROLOGICAL: No numbness or weakness  SKIN: No itching, rashes      PAST MEDICAL & SURGICAL HISTORY  Right knee pain    OA (osteoarthritis)    Anxiety  and depression    Back pain    Obesity with body mass index (BMI) of 30.0 to 39.9    S/P laparoscopic cholecystectomy    Previous back surgery  L5 S1-S2    S/P right knee surgery  tkr        SOCIAL HISTORY:  Negative for smoking/alcohol/drug use.     ALLERGIES:  No Known Allergies      MEDICATIONS:  STANDING MEDICATIONS  acetaminophen     Tablet .. 650 milliGRAM(s) Oral every 6 hours  aspirin enteric coated 81 milliGRAM(s) Oral every 12 hours  chlorhexidine 2% Cloths 1 Application(s) Topical <User Schedule>  dexAMETHasone     Tablet 4 milliGRAM(s) Oral once  ketorolac   Injectable 15 milliGRAM(s) IV Push every 6 hours  oxyCODONE  ER Tablet 30 milliGRAM(s) Oral every 12 hours  pantoprazole    Tablet 40 milliGRAM(s) Oral before breakfast  polyethylene glycol 3350 17 Gram(s) Oral at bedtime  pregabalin 200 milliGRAM(s) Oral three times a day  senna 2 Tablet(s) Oral at bedtime  sodium chloride 0.9%. 1000 milliLiter(s) IV Continuous <Continuous>    PRN MEDICATIONS  ALPRAZolam 2 milliGRAM(s) Oral every 8 hours PRN  aluminum hydroxide/magnesium hydroxide/simethicone Suspension 30 milliLiter(s) Oral four times a day PRN  magnesium hydroxide Suspension 30 milliLiter(s) Oral daily PRN  ondansetron Injectable 4 milliGRAM(s) IV Push every 6 hours PRN  oxyCODONE    IR 30 milliGRAM(s) Oral every 4 hours PRN    VITALS:   T(F): 98.2  HR: 76  BP: 118/85  RR: 18  SpO2: 95%    LABS:                          9.9    5.21  )-----------( 137      ( 25 May 2024 07:36 )             29.6                         RADIOLOGY:    PHYSICAL EXAM:  GEN: No acute distress  HEENT: normocephalic, atraumatic, aniceteric  LUNGS: Clear to auscultation bilaterally, no rales/wheezing/ rhonchi  HEART: S1/S2 present. RRR, no murmurs  ABD: Soft, non-tender, non-distended. Bowel sounds present  EXT: NC/NC/NE/2+PP/HUNTER  NEURO: AAOX3, normal affect      ASSESSMENT AND PLAN:

## 2024-05-25 NOTE — OCCUPATIONAL THERAPY INITIAL EVALUATION ADULT - LIVES WITH, PROFILE
son in an apartment in a private house 2 steps down to enter with (L) Handrail + 1 Step up (L) Handrail. 2 steps into living room. (+) walk in shower/children

## 2024-05-25 NOTE — DISCHARGE NOTE NURSING/CASE MANAGEMENT/SOCIAL WORK - NSDCPEFALRISK_GEN_ALL_CORE
For information on Fall & Injury Prevention, visit: [email or web address] OR  [email or web address] OR  https://www.cdc.gov/steadi/patient.html

## 2024-05-25 NOTE — OCCUPATIONAL THERAPY INITIAL EVALUATION ADULT - LEVEL OF INDEPENDENCE: SHOWER, REHAB EVAL
Pt stated will sponge bathe at home at this time. Pt advised to practice with Home care therapist and to have family member present prior to attempting independently. Pt verbalized good understanding.

## 2024-05-25 NOTE — OCCUPATIONAL THERAPY INITIAL EVALUATION ADULT - GENERAL OBSERVATIONS, REHAB EVAL
08:30-09:15 Chart reviewed, ok to treat by Occupational Therapist as confirmed by RN Anabell, Pt received in semi-Hurley's in bed (+) Mediplex Left knee in NAD. Pt in agreement with OT IE.

## 2024-05-25 NOTE — OCCUPATIONAL THERAPY INITIAL EVALUATION ADULT - REHAB POTENTIAL, OT EVAL
good, to achieve stated therapy goals Pt demonstrated good understanding of home safety, adaptive equipment, and safe car transfers./good, to achieve stated therapy goals

## 2024-05-25 NOTE — CONSULT NOTE ADULT - ASSESSMENT
Pt is a 65F with a PMHx of OA, Anxiety/Depression, and back pain that underwent a left total knee replacement with dr vania nguyen. pt tolerated the procedure well with no complications. Was transferred to recovery unit in stable condition then the the floor. post op received dvt proph and antibiotics were given. pt participated with physical therapy and did well. Was cleared for discharge home.         Plan:  - lokelma 10g x1   - c/w home medications   - wbat with a walker  - follow up with dr vania nguyen asa outpatient   - continue with routine f/u with out-pt providers and health maintenance    Pt is a 58F with a PMHx of OA, Anxiety/Depression, and back pain that underwent a left total knee replacement with dr vania nguyen. pt tolerated the procedure well with no complications. Was transferred to recovery unit in stable condition then the the floor. post op received dvt proph and antibiotics were given. pt participated with physical therapy and did well. Was cleared for discharge home.         Plan:  - lokelma 10g x1   - c/w home medications   - wbat with a walker  - follow up with dr vania nguyen asa outpatient   - continue with routine f/u with out-pt providers and health maintenance

## 2024-05-25 NOTE — DISCHARGE NOTE NURSING/CASE MANAGEMENT/SOCIAL WORK - PATIENT PORTAL LINK FT
You can access the FollowMyHealth Patient Portal offered by [facility] by registering at the following website: [email or web address]. By joining [facility]’s FollowMyHealth portal, you will also be able to view your health information using other applications (apps) compatible with our system.

## 2024-05-25 NOTE — CHART NOTE - NSCHARTNOTEFT_GEN_A_CORE
Vital Signs Last 24 Hrs    T(F): 98.2 (05-25-24 @ 08:05), Max: 98.8 (05-25-24 @ 00:34)  HR: 76 (05-25-24 @ 08:05) (70 - 96)  BP: 118/85 (05-25-24 @ 08:05)  RR: 18 (05-25-24 @ 08:05) (16 - 23)  SpO2: 95% (05-25-24 @ 08:05) (95% - 100%)    05-24 @ 07:01  -  05-25 @ 07:00  --------------------------------------------------------  IN: 150 mL / OUT: 1600 mL / NET: -1450 mL                          9.9    5.21  )-----------( 137      ( 25 May 2024 07:36 )             29.6       05-25    142  |  105  |  14  ----------------------------<  111<H>  admission K 5.0  **5.2<H>   |  32  |  1.0    Ca    8.5      25 May 2024 07:36    # Mildly increased K  -  treated with Lokelma 10GM x 1 dose: discussed with Dr Proctor  - Potassium restricted diet noted in discharge papers  - patient will inform PMD on next visit    Cleared by OT                                CAPILLARY BLOOD GLUCOSE      POCT Blood Glucose.: 87 mg/dL (24 May 2024 07:02) Vital Signs Last 24 Hrs    T(F): 98.2 (05-25-24 @ 08:05), Max: 98.8 (05-25-24 @ 00:34)  HR: 76 (05-25-24 @ 08:05) (70 - 96)  BP: 118/85 (05-25-24 @ 08:05)  RR: 18 (05-25-24 @ 08:05) (16 - 23)  SpO2: 95% (05-25-24 @ 08:05) (95% - 100%)    05-24 @ 07:01  -  05-25 @ 07:00  --------------------------------------------------------  IN: 150 mL / OUT: 1600 mL / NET: -1450 mL                          9.9    5.21  )-----------( 137      ( 25 May 2024 07:36 )             29.6       05-25    142  |  105  |  14  ----------------------------<  111<H>  admission K 5.0  **5.2<H>   |  32  |  1.0    Ca    8.5      25 May 2024 07:36    # Mildly increased K  -  treated with Lokelma 10GM x 1 dose: discussed with Dr Proctor  - Potassium restricted diet noted in discharge papers  - patient will inform PMD on next visit    Cleared by OT    Cleared by PT                                CAPILLARY BLOOD GLUCOSE      POCT Blood Glucose.: 87 mg/dL (24 May 2024 07:02) Vital Signs Last 24 Hrs    T(F): 98.2 (05-25-24 @ 08:05), Max: 98.8 (05-25-24 @ 00:34)  HR: 76 (05-25-24 @ 08:05) (70 - 96)  BP: 118/85 (05-25-24 @ 08:05)  RR: 18 (05-25-24 @ 08:05) (16 - 23)  SpO2: 95% (05-25-24 @ 08:05) (95% - 100%)    05-24 @ 07:01  -  05-25 @ 07:00  --------------------------------------------------------  IN: 150 mL / OUT: 1600 mL / NET: -1450 mL                          9.9    5.21  )-----------( 137      ( 25 May 2024 07:36 )             29.6       05-25    142  |  105  |  14  ----------------------------<  111<H>  admission K 5.0  **5.2<H>   |  32  |  1.0    Ca    8.5      25 May 2024 07:36    # Mildly increased K  -  treated with Lokelma 10GM x 1 dose: discussed with Dr Proctor  - Potassium restricted diet noted in discharge papers  - patient will inform PMD on next visit    Cleared by OT    Cleared by PT    will speak with Dr Chanelle Patel                                CAPILLARY BLOOD GLUCOSE      POCT Blood Glucose.: 87 mg/dL (24 May 2024 07:02)

## 2024-05-28 ENCOUNTER — TRANSCRIPTION ENCOUNTER (OUTPATIENT)
Age: 59
End: 2024-05-28

## 2024-05-29 LAB — SURGICAL PATHOLOGY STUDY: SIGNIFICANT CHANGE UP

## 2024-06-05 DIAGNOSIS — E87.5 HYPERKALEMIA: ICD-10-CM

## 2024-06-05 DIAGNOSIS — F32.A DEPRESSION, UNSPECIFIED: ICD-10-CM

## 2024-06-05 DIAGNOSIS — M17.12 UNILATERAL PRIMARY OSTEOARTHRITIS, LEFT KNEE: ICD-10-CM

## 2024-06-05 DIAGNOSIS — E66.9 OBESITY, UNSPECIFIED: ICD-10-CM

## 2024-06-05 DIAGNOSIS — F41.9 ANXIETY DISORDER, UNSPECIFIED: ICD-10-CM

## 2024-06-13 ENCOUNTER — RESULT CHARGE (OUTPATIENT)
Age: 59
End: 2024-06-13

## 2024-06-13 ENCOUNTER — APPOINTMENT (OUTPATIENT)
Dept: ORTHOPEDIC SURGERY | Facility: CLINIC | Age: 59
End: 2024-06-13
Payer: MEDICARE

## 2024-06-13 DIAGNOSIS — Z96.652 PRESENCE OF LEFT ARTIFICIAL KNEE JOINT: ICD-10-CM

## 2024-06-13 PROBLEM — E66.9 OBESITY, UNSPECIFIED: Chronic | Status: ACTIVE | Noted: 2024-05-16

## 2024-06-13 PROBLEM — F41.9 ANXIETY DISORDER, UNSPECIFIED: Chronic | Status: ACTIVE | Noted: 2024-05-07

## 2024-06-13 PROCEDURE — 73560 X-RAY EXAM OF KNEE 1 OR 2: CPT | Mod: 50

## 2024-06-13 PROCEDURE — 99024 POSTOP FOLLOW-UP VISIT: CPT

## 2024-06-13 NOTE — HISTORY OF PRESENT ILLNESS
[de-identified] : s/p Left TKA doing well postop course uncomplicated, home PT complete, progressing appropriately, now ready for OP PT.  pain controlled (-)n/v/cp/sob  Left knee exam shows well healed incision NTTP AROM 2-90 negative francis  Imaging:  AP and Lateral views were obtained of the knees, including the contralateral knee for comparison. X-rays are negative for acute bone or soft tissue trauma. Left knee replacement in good alignment without radiographic evidence of complication.  Plan: continue home exercise activities as tolerated OP PT continue dvt prophylaxis f/u at 6 weeks.

## 2024-06-28 ENCOUNTER — TRANSCRIPTION ENCOUNTER (OUTPATIENT)
Age: 59
End: 2024-06-28

## 2024-07-19 ENCOUNTER — RX RENEWAL (OUTPATIENT)
Age: 59
End: 2024-07-19

## 2024-07-25 ENCOUNTER — TRANSCRIPTION ENCOUNTER (OUTPATIENT)
Age: 59
End: 2024-07-25

## 2024-08-09 ENCOUNTER — TRANSCRIPTION ENCOUNTER (OUTPATIENT)
Age: 59
End: 2024-08-09

## 2024-08-29 ENCOUNTER — APPOINTMENT (OUTPATIENT)
Dept: RHEUMATOLOGY | Facility: CLINIC | Age: 59
End: 2024-08-29
Payer: MEDICARE

## 2024-08-29 VITALS
OXYGEN SATURATION: 96 % | BODY MASS INDEX: 38.83 KG/M2 | HEART RATE: 101 BPM | TEMPERATURE: 98.4 F | HEIGHT: 62 IN | WEIGHT: 211 LBS | SYSTOLIC BLOOD PRESSURE: 140 MMHG | DIASTOLIC BLOOD PRESSURE: 70 MMHG

## 2024-08-29 DIAGNOSIS — M25.511 PAIN IN RIGHT SHOULDER: ICD-10-CM

## 2024-08-29 DIAGNOSIS — R76.8 OTHER SPECIFIED ABNORMAL IMMUNOLOGICAL FINDINGS IN SERUM: ICD-10-CM

## 2024-08-29 DIAGNOSIS — G89.29 PAIN IN RIGHT SHOULDER: ICD-10-CM

## 2024-08-29 DIAGNOSIS — M25.512 PAIN IN RIGHT SHOULDER: ICD-10-CM

## 2024-08-29 DIAGNOSIS — M54.50 LOW BACK PAIN, UNSPECIFIED: ICD-10-CM

## 2024-08-29 DIAGNOSIS — R53.83 OTHER FATIGUE: ICD-10-CM

## 2024-08-29 PROCEDURE — 99214 OFFICE O/P EST MOD 30 MIN: CPT

## 2024-08-29 NOTE — HISTORY OF PRESENT ILLNESS
[FreeTextEntry1] : After her last appointment over 1 year ago, pt underwent b/l knee replacements, and notes improvement in her knee pain, although she still has pain from the surgery. She is currently having severe b/l shoulder pain and stiffness, with difficulty lifting her shoulders. + Severe fatigue. Lyrica is still helping significantly, pt says she has muscle aches and flu-like symptoms when she doesn't take it. + TTP in the back and also in the proximal LEs, she noticed that when her grandson is standing on her thighs she has pain there. + cataracts which pt attributes to having too many steroid injections in the past, she is going to go for cataract surgery.  Previous treatments: - HA injections for knees didn't help - Had ?genicular nerve block with no improvement - unclear - Was told that she needs knee replacements but does not wish to have them done at this time - Gabapentin - didn't help - Lyrica 200 mg q8 hours - helps somewhat - Naproxen helps - Steroid injections help transiently - Pt does not want to take any anti-depressants as her son had a severe reaction to them with persistent delusional symptoms, also says she previously tried duloxetine with no improvement  Previous HPI: Pt has had years of pain in her upper and lower back, shoulders and knees, with difficulty performing ADLs such as cleaning. She was reportedly diagnosed with systemic lupus erythematosus many years ago, and was treated with Plaquenil, Cellcept and methotrexate, but her symptoms did not improve, and she was subsequently told she may have fibromyalgia? Her mother passed away in Fall 2021, and since that time pt gained 50 lbs, and also noticed hair growing on her face, so she is concerned she may have an underlying medical condition. Also + very dry skin. Denies diarrhea, bloody stool, Raynaud's, oral ulcers, other rashes.   Pt has been experiencing severe R lateral elbow pain which she attributes to taking care of her sister, who has cancer, worse with supinating her arm and with extending her 3rd and 4th fingers. She has had similar symptoms with lateral epicondylitis in the past, and received steroid injections with improvement. Also + continued b/l knee pain; she used to have injections done with Dr. Rodas. + Continued lack of appetite, + cold extremities.   Pt is currently taking gabapentin for pain but feels that it is not helping her.   + Long-standing difficulty sleeping, denies snoring.   + 4 miscarriages.  Physical exam: GEN: AAO woman sitting on exam table in NAD SKIN: + Dry skin, no rashes MOUTH: Moist mucous membranes, + dentures, no oral ulcers, normal oral aperture ENT: no LAD HEAD: no alopecia PULM: Clear to auscultation b/l CV: Regular rate and rhythm, no murmurs MSK: Neck: + Pain with lateral deviation b/l but with full ROM Shoulders: + Limited abduction to approx 120 degrees b/l, + Apley scratch test b/l Elbows: No effusions, full ROM b/l Wrists: Full ROM b/l, no effusions Hands: + Heberden's nodes b/l R>L Hips: Full ROM b/l Knees: + Bony hypertrophy b/l, full ROM b/l, no effusions Ankles: no effusions, full ROM b/l Feet: no effusions, no TTP EXT: no LE edema b/l

## 2024-08-29 NOTE — ASSESSMENT
[FreeTextEntry1] : Shoulder pain: Pt's physical exam is suggestive of GH arthritis, likely osteoarthritis based on her symptoms; also with e/o OA on hand exam, although she denies any hand pain or stiffness. Also suspect that she has fibromyalgia as she has diffuse soft tissue pain and TTP. Pt also reports significant fatigue, which is a nonspecific symptom for which pt would like rheum testing, but she doesn't have any specific symptoms for an underlying systemic connective tissue disease. - f/u labs for fatigue - f/u b/l shoulder x-rays  - Continue Lyrica 200 mg q 8 hours  f/u in 3 months, will call pt with lab and x-ray results

## 2024-09-03 RX ORDER — GABAPENTIN 300 MG/1
300 CAPSULE ORAL
Qty: 810 | Refills: 0 | Status: ACTIVE | COMMUNITY
Start: 2024-09-03 | End: 1900-01-01

## 2024-10-01 ENCOUNTER — APPOINTMENT (OUTPATIENT)
Dept: ORTHOPEDIC SURGERY | Facility: CLINIC | Age: 59
End: 2024-10-01
Payer: MEDICARE

## 2024-10-01 DIAGNOSIS — Z96.652 PRESENCE OF LEFT ARTIFICIAL KNEE JOINT: ICD-10-CM

## 2024-10-01 PROCEDURE — 99213 OFFICE O/P EST LOW 20 MIN: CPT

## 2024-10-01 NOTE — HISTORY OF PRESENT ILLNESS
[de-identified] : f/u of bl knees doing well now 4+month post op on left, 8 months on right. rom b/l 0-120 knees stable  nttp  plan home exercise program f/u in 3 months.

## 2024-11-06 ENCOUNTER — NON-APPOINTMENT (OUTPATIENT)
Age: 59
End: 2024-11-06

## 2024-12-04 ENCOUNTER — RX RENEWAL (OUTPATIENT)
Age: 59
End: 2024-12-04

## 2025-02-18 ENCOUNTER — APPOINTMENT (OUTPATIENT)
Dept: ORTHOPEDIC SURGERY | Facility: CLINIC | Age: 60
End: 2025-02-18

## 2025-03-06 ENCOUNTER — APPOINTMENT (OUTPATIENT)
Dept: RHEUMATOLOGY | Facility: CLINIC | Age: 60
End: 2025-03-06
Payer: MEDICARE

## 2025-03-06 DIAGNOSIS — G89.29 PAIN IN RIGHT SHOULDER: ICD-10-CM

## 2025-03-06 DIAGNOSIS — M25.511 PAIN IN RIGHT SHOULDER: ICD-10-CM

## 2025-03-06 DIAGNOSIS — M25.512 PAIN IN RIGHT SHOULDER: ICD-10-CM

## 2025-03-06 PROCEDURE — G2211 COMPLEX E/M VISIT ADD ON: CPT | Mod: 93

## 2025-03-06 PROCEDURE — 99214 OFFICE O/P EST MOD 30 MIN: CPT | Mod: 93

## 2025-03-06 RX ORDER — CYCLOBENZAPRINE HYDROCHLORIDE 5 MG/1
5 TABLET, FILM COATED ORAL
Qty: 30 | Refills: 1 | Status: ACTIVE | COMMUNITY
Start: 2025-03-06 | End: 1900-01-01

## 2025-04-28 NOTE — ED ADULT NURSE REASSESSMENT NOTE - NS ED NURSE REASSESS COMMENT FT1
Came to patient to reassess pain after 2nd dose of Morphine  4mg IV. Pt reports no change in pain level and asking if she should just leave and take her pain medication at home. Pt is continually asking for more pain medication for her chronic pain. Attending aware. Waiting for CT to take patient.
142

## 2025-05-27 ENCOUNTER — APPOINTMENT (OUTPATIENT)
Dept: ORTHOPEDIC SURGERY | Facility: CLINIC | Age: 60
End: 2025-05-27